# Patient Record
Sex: MALE | Race: OTHER | NOT HISPANIC OR LATINO
[De-identification: names, ages, dates, MRNs, and addresses within clinical notes are randomized per-mention and may not be internally consistent; named-entity substitution may affect disease eponyms.]

---

## 2017-02-13 ENCOUNTER — APPOINTMENT (OUTPATIENT)
Dept: OPHTHALMOLOGY | Facility: CLINIC | Age: 57
End: 2017-02-13

## 2017-02-13 PROBLEM — Z00.00 ENCOUNTER FOR PREVENTIVE HEALTH EXAMINATION: Status: ACTIVE | Noted: 2017-02-13

## 2017-02-27 ENCOUNTER — APPOINTMENT (OUTPATIENT)
Dept: OPHTHALMOLOGY | Facility: CLINIC | Age: 57
End: 2017-02-27

## 2020-04-28 ENCOUNTER — TRANSCRIPTION ENCOUNTER (OUTPATIENT)
Age: 60
End: 2020-04-28

## 2020-04-28 ENCOUNTER — INPATIENT (INPATIENT)
Facility: HOSPITAL | Age: 60
LOS: 0 days | Discharge: HOME | End: 2020-04-29
Attending: UROLOGY
Payer: COMMERCIAL

## 2020-04-28 VITALS
DIASTOLIC BLOOD PRESSURE: 91 MMHG | HEART RATE: 98 BPM | RESPIRATION RATE: 18 BRPM | OXYGEN SATURATION: 98 % | SYSTOLIC BLOOD PRESSURE: 190 MMHG | TEMPERATURE: 98 F

## 2020-04-28 DIAGNOSIS — N20.0 CALCULUS OF KIDNEY: ICD-10-CM

## 2020-04-28 LAB
ALBUMIN SERPL ELPH-MCNC: 3.8 G/DL — SIGNIFICANT CHANGE UP (ref 3.3–5)
ALP SERPL-CCNC: 64 U/L — SIGNIFICANT CHANGE UP (ref 40–120)
ALT FLD-CCNC: 19 U/L — SIGNIFICANT CHANGE UP (ref 4–41)
ANION GAP SERPL CALC-SCNC: 13 MMO/L — SIGNIFICANT CHANGE UP (ref 7–14)
APPEARANCE UR: CLEAR — SIGNIFICANT CHANGE UP
AST SERPL-CCNC: 15 U/L — SIGNIFICANT CHANGE UP (ref 4–40)
BACTERIA # UR AUTO: NEGATIVE — SIGNIFICANT CHANGE UP
BASE EXCESS BLDV CALC-SCNC: 3.7 MMOL/L — SIGNIFICANT CHANGE UP
BASOPHILS # BLD AUTO: 0.04 K/UL — SIGNIFICANT CHANGE UP (ref 0–0.2)
BASOPHILS NFR BLD AUTO: 0.3 % — SIGNIFICANT CHANGE UP (ref 0–2)
BILIRUB SERPL-MCNC: 0.6 MG/DL — SIGNIFICANT CHANGE UP (ref 0.2–1.2)
BILIRUB UR-MCNC: NEGATIVE — SIGNIFICANT CHANGE UP
BLOOD GAS VENOUS - CREATININE: 1.39 MG/DL — HIGH (ref 0.5–1.3)
BLOOD GAS VENOUS - FIO2: 21 — SIGNIFICANT CHANGE UP
BLOOD UR QL VISUAL: SIGNIFICANT CHANGE UP
BUN SERPL-MCNC: 18 MG/DL — SIGNIFICANT CHANGE UP (ref 7–23)
CALCIUM SERPL-MCNC: 9.3 MG/DL — SIGNIFICANT CHANGE UP (ref 8.4–10.5)
CHLORIDE BLDV-SCNC: 106 MMOL/L — SIGNIFICANT CHANGE UP (ref 96–108)
CHLORIDE SERPL-SCNC: 101 MMOL/L — SIGNIFICANT CHANGE UP (ref 98–107)
CO2 SERPL-SCNC: 24 MMOL/L — SIGNIFICANT CHANGE UP (ref 22–31)
COLOR SPEC: SIGNIFICANT CHANGE UP
CREAT SERPL-MCNC: 1.49 MG/DL — HIGH (ref 0.5–1.3)
EOSINOPHIL # BLD AUTO: 0.07 K/UL — SIGNIFICANT CHANGE UP (ref 0–0.5)
EOSINOPHIL NFR BLD AUTO: 0.5 % — SIGNIFICANT CHANGE UP (ref 0–6)
GAS PNL BLDV: 135 MMOL/L — LOW (ref 136–146)
GLUCOSE BLDV-MCNC: 120 MG/DL — HIGH (ref 70–99)
GLUCOSE SERPL-MCNC: 122 MG/DL — HIGH (ref 70–99)
GLUCOSE UR-MCNC: NEGATIVE — SIGNIFICANT CHANGE UP
HCO3 BLDV-SCNC: 26 MMOL/L — SIGNIFICANT CHANGE UP (ref 20–27)
HCT VFR BLD CALC: 37.8 % — LOW (ref 39–50)
HCT VFR BLDV CALC: 41.2 % — SIGNIFICANT CHANGE UP (ref 39–51)
HGB BLD-MCNC: 12.9 G/DL — LOW (ref 13–17)
HGB BLDV-MCNC: 13.4 G/DL — SIGNIFICANT CHANGE UP (ref 13–17)
HYALINE CASTS # UR AUTO: NEGATIVE — SIGNIFICANT CHANGE UP
IMM GRANULOCYTES NFR BLD AUTO: 0.5 % — SIGNIFICANT CHANGE UP (ref 0–1.5)
INR BLD: 1.27 — HIGH (ref 0.88–1.17)
KETONES UR-MCNC: NEGATIVE — SIGNIFICANT CHANGE UP
LACTATE BLDV-MCNC: 0.8 MMOL/L — SIGNIFICANT CHANGE UP (ref 0.5–2)
LEUKOCYTE ESTERASE UR-ACNC: NEGATIVE — SIGNIFICANT CHANGE UP
LIDOCAIN IGE QN: 16.9 U/L — SIGNIFICANT CHANGE UP (ref 7–60)
LYMPHOCYTES # BLD AUTO: 0.62 K/UL — LOW (ref 1–3.3)
LYMPHOCYTES # BLD AUTO: 4.1 % — LOW (ref 13–44)
MCHC RBC-ENTMCNC: 28 PG — SIGNIFICANT CHANGE UP (ref 27–34)
MCHC RBC-ENTMCNC: 34.1 % — SIGNIFICANT CHANGE UP (ref 32–36)
MCV RBC AUTO: 82.2 FL — SIGNIFICANT CHANGE UP (ref 80–100)
MONOCYTES # BLD AUTO: 1.61 K/UL — HIGH (ref 0–0.9)
MONOCYTES NFR BLD AUTO: 10.6 % — SIGNIFICANT CHANGE UP (ref 2–14)
NEUTROPHILS # BLD AUTO: 12.82 K/UL — HIGH (ref 1.8–7.4)
NEUTROPHILS NFR BLD AUTO: 84 % — HIGH (ref 43–77)
NITRITE UR-MCNC: NEGATIVE — SIGNIFICANT CHANGE UP
NRBC # FLD: 0 K/UL — SIGNIFICANT CHANGE UP (ref 0–0)
PCO2 BLDV: 50 MMHG — SIGNIFICANT CHANGE UP (ref 41–51)
PH BLDV: 7.37 PH — SIGNIFICANT CHANGE UP (ref 7.32–7.43)
PH UR: 6 — SIGNIFICANT CHANGE UP (ref 5–8)
PLATELET # BLD AUTO: 265 K/UL — SIGNIFICANT CHANGE UP (ref 150–400)
PMV BLD: 10.9 FL — SIGNIFICANT CHANGE UP (ref 7–13)
PO2 BLDV: 25 MMHG — LOW (ref 35–40)
POTASSIUM BLDV-SCNC: 4.1 MMOL/L — SIGNIFICANT CHANGE UP (ref 3.4–4.5)
POTASSIUM SERPL-MCNC: 4.3 MMOL/L — SIGNIFICANT CHANGE UP (ref 3.5–5.3)
POTASSIUM SERPL-SCNC: 4.3 MMOL/L — SIGNIFICANT CHANGE UP (ref 3.5–5.3)
PROT SERPL-MCNC: 7.6 G/DL — SIGNIFICANT CHANGE UP (ref 6–8.3)
PROT UR-MCNC: 20 — SIGNIFICANT CHANGE UP
PROTHROM AB SERPL-ACNC: 14.7 SEC — HIGH (ref 9.8–13.1)
RBC # BLD: 4.6 M/UL — SIGNIFICANT CHANGE UP (ref 4.2–5.8)
RBC # FLD: 13.9 % — SIGNIFICANT CHANGE UP (ref 10.3–14.5)
RBC CASTS # UR COMP ASSIST: SIGNIFICANT CHANGE UP (ref 0–?)
SAO2 % BLDV: 36.2 % — LOW (ref 60–85)
SARS-COV-2 RNA SPEC QL NAA+PROBE: SIGNIFICANT CHANGE UP
SODIUM SERPL-SCNC: 138 MMOL/L — SIGNIFICANT CHANGE UP (ref 135–145)
SP GR SPEC: 1.01 — SIGNIFICANT CHANGE UP (ref 1–1.04)
SQUAMOUS # UR AUTO: SIGNIFICANT CHANGE UP
UROBILINOGEN FLD QL: NORMAL — SIGNIFICANT CHANGE UP
WBC # BLD: 15.23 K/UL — HIGH (ref 3.8–10.5)
WBC # FLD AUTO: 15.23 K/UL — HIGH (ref 3.8–10.5)
WBC UR QL: SIGNIFICANT CHANGE UP (ref 0–?)

## 2020-04-28 PROCEDURE — 74177 CT ABD & PELVIS W/CONTRAST: CPT | Mod: 26

## 2020-04-28 RX ORDER — SODIUM CHLORIDE 9 MG/ML
1000 INJECTION, SOLUTION INTRAVENOUS
Refills: 0 | Status: DISCONTINUED | OUTPATIENT
Start: 2020-04-28 | End: 2020-04-29

## 2020-04-28 RX ORDER — HYDROMORPHONE HYDROCHLORIDE 2 MG/ML
0.5 INJECTION INTRAMUSCULAR; INTRAVENOUS; SUBCUTANEOUS EVERY 4 HOURS
Refills: 0 | Status: DISCONTINUED | OUTPATIENT
Start: 2020-04-28 | End: 2020-04-29

## 2020-04-28 RX ORDER — SODIUM CHLORIDE 9 MG/ML
1000 INJECTION INTRAMUSCULAR; INTRAVENOUS; SUBCUTANEOUS ONCE
Refills: 0 | Status: COMPLETED | OUTPATIENT
Start: 2020-04-28 | End: 2020-04-28

## 2020-04-28 RX ORDER — HYDROMORPHONE HYDROCHLORIDE 2 MG/ML
1 INJECTION INTRAMUSCULAR; INTRAVENOUS; SUBCUTANEOUS EVERY 4 HOURS
Refills: 0 | Status: DISCONTINUED | OUTPATIENT
Start: 2020-04-28 | End: 2020-04-29

## 2020-04-28 RX ORDER — ACETAMINOPHEN 500 MG
975 TABLET ORAL ONCE
Refills: 0 | Status: COMPLETED | OUTPATIENT
Start: 2020-04-28 | End: 2020-04-28

## 2020-04-28 RX ORDER — ACETAMINOPHEN 500 MG
975 TABLET ORAL EVERY 6 HOURS
Refills: 0 | Status: DISCONTINUED | OUTPATIENT
Start: 2020-04-28 | End: 2020-04-29

## 2020-04-28 RX ADMIN — HYDROMORPHONE HYDROCHLORIDE 1 MILLIGRAM(S): 2 INJECTION INTRAMUSCULAR; INTRAVENOUS; SUBCUTANEOUS at 21:25

## 2020-04-28 RX ADMIN — SODIUM CHLORIDE 1000 MILLILITER(S): 9 INJECTION INTRAMUSCULAR; INTRAVENOUS; SUBCUTANEOUS at 19:34

## 2020-04-28 RX ADMIN — Medication 975 MILLIGRAM(S): at 17:20

## 2020-04-28 RX ADMIN — SODIUM CHLORIDE 2000 MILLILITER(S): 9 INJECTION INTRAMUSCULAR; INTRAVENOUS; SUBCUTANEOUS at 17:48

## 2020-04-28 NOTE — ED PROVIDER NOTE - PSYCHIATRIC, MLM
72 Alert and oriented to person, place, time/situation. normal mood and affect. no apparent risk to self or others.

## 2020-04-28 NOTE — ED ADULT TRIAGE NOTE - CHIEF COMPLAINT QUOTE
pt c/o right flank pain x a few days.  pt was seen at urgent care 4 days ago and prescribed flomax and motrin but the pain persists

## 2020-04-28 NOTE — CONSULT NOTE ADULT - SUBJECTIVE AND OBJECTIVE BOX
HPI: Mr. Mcgarry is a 59yom with PMH of horshoe kidney and kidney stones (passed w/o issues ~10 and 20 years ago) who presents with 6 days of pain found to have a 1.2 cm obstructing UPJ kidney stone on the R side.  He originally went [facility] approximately 6 days ago when his symptoms first started.  He was prescribed flomax for a presumed kidney stone, which did not help his symptoms significantly.  Pain now significantly improved s/p tylenol.    O: Vital Signs Last 24 Hrs  T(C): 37.4 (2020 16:32), Max: 37.4 (2020 16:32)  T(F): 99.3 (2020 16:32), Max: 99.3 (2020 16:32)  HR: 91 (2020 16:32) (91 - 98)  BP: 127/75 (2020 16:32) (127/75 - 190/91)  BP(mean): --  RR: 16 (2020 16:32) (16 - 18)  SpO2: 100% (2020 16:32) (98% - 100%)        Physical Exam:    Gen: In no acute distress  Resp: No additional work of breathing   GI: Soft, non-tender, non-distended, with normoactive bowel sounds.  No masses. + CVA tenderness on R.  MSK: Moves all extremities equally  Skin: No rashes    Labs:                        12.9   15.23 )-----------( 265      ( 2020 16:23 )             37.8     2020 16:23    138    |  101    |  18     ----------------------------<  122    4.3     |  24     |  1.49     Ca    9.3        2020 16:23    TPro  7.6    /  Alb  3.8    /  TBili  0.6    /  DBili  x      /  AST  15     /  ALT  19     /  AlkPhos  64     2020 16:23    PT/INR - ( 2020 16:23 )   PT: 14.7 SEC;   INR: 1.27            CAPILLARY BLOOD GLUCOSE            LIVER FUNCTIONS - ( 2020 16:23 )  Alb: 3.8 g/dL / Pro: 7.6 g/dL / ALK PHOS: 64 u/L / ALT: 19 u/L / AST: 15 u/L / GGT: x             Urinalysis Basic - ( 2020 16:29 )    Color: LIGHT YELLOW / Appearance: CLEAR / S.014 / pH: 6.0  Gluc: NEGATIVE / Ketone: NEGATIVE  / Bili: NEGATIVE / Urobili: NORMAL   Blood: TRACE / Protein: 20 / Nitrite: NEGATIVE   Leuk Esterase: NEGATIVE / RBC: 0-2 / WBC 0-2   Sq Epi: OCC / Non Sq Epi: x / Bacteria: NEGATIVE        MEDICATIONS  (STANDING):    MEDICATIONS  (PRN): O: Vital Signs Last 24 Hrs  T(C): 37.4 (2020 16:32), Max: 37.4 (2020 16:32)  T(F): 99.3 (2020 16:32), Max: 99.3 (2020 16:32)  HR: 91 (2020 16:32) (91 - 98)  BP: 127/75 (2020 16:32) (127/75 - 190/91)  BP(mean): --  RR: 16 (2020 16:32) (16 - 18)  SpO2: 100% (2020 16:32) (98% - 100%)        Physical Exam:    Gen: In no acute distress  Resp: No additional work of breathing   GI: Soft, non-tender, non-distended, with normoactive bowel sounds.  No masses. + CVA tenderness on R.  MSK: Moves all extremities equally  Skin: No rashes    Labs:                        12.9   15.23 )-----------( 265      ( 2020 16:23 )             37.8     2020 16:23    138    |  101    |  18     ----------------------------<  122    4.3     |  24     |  1.49     Ca    9.3        2020 16:23    TPro  7.6    /  Alb  3.8    /  TBili  0.6    /  DBili  x      /  AST  15     /  ALT  19     /  AlkPhos  64     2020 16:23    PT/INR - ( 2020 16:23 )   PT: 14.7 SEC;   INR: 1.27            CAPILLARY BLOOD GLUCOSE            LIVER FUNCTIONS - ( 2020 16:23 )  Alb: 3.8 g/dL / Pro: 7.6 g/dL / ALK PHOS: 64 u/L / ALT: 19 u/L / AST: 15 u/L / GGT: x             Urinalysis Basic - ( 2020 16:29 )    Color: LIGHT YELLOW / Appearance: CLEAR / S.014 / pH: 6.0  Gluc: NEGATIVE / Ketone: NEGATIVE  / Bili: NEGATIVE / Urobili: NORMAL   Blood: TRACE / Protein: 20 / Nitrite: NEGATIVE   Leuk Esterase: NEGATIVE / RBC: 0-2 / WBC 0-2   Sq Epi: OCC / Non Sq Epi: x / Bacteria: NEGATIVE        MEDICATIONS  (STANDING):    MEDICATIONS  (PRN): This 58 yo M presents to ER with R. 7mm UPJ stone in a horseshoe kidney; hydro present;  pain not controlled    O: Vital Signs Last 24 Hrs  T(C): 37.4 (2020 16:32), Max: 37.4 (2020 16:32)  T(F): 99.3 (2020 16:32), Max: 99.3 (2020 16:32)  HR: 91 (2020 16:32) (91 - 98)  BP: 127/75 (2020 16:32) (127/75 - 190/91)  BP(mean): --  RR: 16 (2020 16:32) (16 - 18)  SpO2: 100% (2020 16:32) (98% - 100%)        Physical Exam:    Gen: In no acute distress  Resp: No additional work of breathing   GI: Soft, non-tender, non-distended, with normoactive bowel sounds.  No masses. + CVA tenderness on R.  MSK: Moves all extremities equally  Skin: No rashes    Labs:                        12.9   15.23 )-----------( 265      ( 2020 16:23 )             37.8     2020 16:23    138    |  101    |  18     ----------------------------<  122    4.3     |  24     |  1.49     Ca    9.3        2020 16:23    TPro  7.6    /  Alb  3.8    /  TBili  0.6    /  DBili  x      /  AST  15     /  ALT  19     /  AlkPhos  64     2020 16:23    PT/INR - ( 2020 16:23 )   PT: 14.7 SEC;   INR: 1.27            CAPILLARY BLOOD GLUCOSE            LIVER FUNCTIONS - ( 2020 16:23 )  Alb: 3.8 g/dL / Pro: 7.6 g/dL / ALK PHOS: 64 u/L / ALT: 19 u/L / AST: 15 u/L / GGT: x             Urinalysis Basic - ( 2020 16:29 )    Color: LIGHT YELLOW / Appearance: CLEAR / S.014 / pH: 6.0  Gluc: NEGATIVE / Ketone: NEGATIVE  / Bili: NEGATIVE / Urobili: NORMAL   Blood: TRACE / Protein: 20 / Nitrite: NEGATIVE   Leuk Esterase: NEGATIVE / RBC: 0-2 / WBC 0-2   Sq Epi: OCC / Non Sq Epi: x / Bacteria: NEGATIVE        MEDICATIONS  (STANDING):    MEDICATIONS  (PRN):

## 2020-04-28 NOTE — ED PROVIDER NOTE - OBJECTIVE STATEMENT
58 y/o male with a hx of HTN, Kidney Stones presents to the ER c/o right sided abdominal/back pain x 6 days.  Pt states he was seen at Urgent Care and was told it was likely a Kidney Stone; pt was given Flomax and Motrin with no relief.  Pt denies nausea, vomiting, fevers, chills, dysuria, frequency, hematuria, hx of Kidney Stones.  Pt reports hx of horse shoe kidney. 60 y/o male with a hx of HTN, Kidney Stones presents to the ER c/o right sided abdominal/back pain x 6 days.  Pt states he was seen at Urgent Care and was told it was likely a Kidney Stone; pt was given Flomax and Motrin with no relief.  Pt denies nausea, vomiting, fevers, chills, dysuria, frequency, hematuria, hx of Kidney Stones.  Pt reports hx of horse shoe kidney.  Attending - Agree with above.  I evaluated patient myself. 60 y/o M c/o right back and right abd pain x 6 days.  To UC last Friday and told likely nephrolithiasis.  Taking flomax and motrin without relief.  Denies fever, n/v/d.  No trauma.  Never had before.  Noted hx nephrolithiasis.  No dysuria or hematuria.  No CP, SOB, cough, or other covid symptoms.

## 2020-04-28 NOTE — ED PROVIDER NOTE - PHYSICAL EXAMINATION
No CVAT No CVAT  ATTENDING PHYSICAL EXAM  GEN - No resp distress.  Appears uncomfortable; A+O x3  HEAD - NC/AT; EYES/NOSE - PERRL, EOMI, mucous membranes moist, no discharge; THROAT: Oral cavity and pharynx normal. No inflammation, swelling, exudate, or lesions  NECK: Neck supple, non-tender without lymphadenopathy, no masses, no JVD  PULMONARY - CTA b/l, symmetric breath sounds  CARDIAC -s1s2, RRR, no M,R,G  ABDOMEN - +BS, + RLQ TTP, ND  BACK - no CVA tenderness, No vertebral or paravertebral tenderness  EXTREMITIES - symmetric pulses, 2+ dp, capillary refill < 2 seconds, no clubbing, no cyanosis, no edema  SKIN - no rash or bruising

## 2020-04-28 NOTE — ED ADULT NURSE NOTE - OBJECTIVE STATEMENT
Patient received for right flank pain x 8 days. Denies n+v/diarrhea/fevers/chests/SOB. Denies any dysuria. IV placed #20g Rt arm. Currently in NAD. Awaiting CT. Report endorsed to JEFFREY Rios.

## 2020-04-28 NOTE — ED PROVIDER NOTE - CLINICAL SUMMARY MEDICAL DECISION MAKING FREE TEXT BOX
58 y/o male with a hx of HTN, Kidney Stones presents to the ER c/o right sided abdominal/back pain x 6 days; pt is well appearing, NAD, +TTP RLQ, will check labs, CT, UA, reassess.

## 2020-04-28 NOTE — CONSULT NOTE ADULT - ASSESSMENT
HPI: Mr. Mcgarry is a 59yom with PMH of horshoe kidney and kidney stones (passed w/o issues ~10 and 20 years ago) who presents with 6 days of pain found to have a 1.2 cm obstructing UPJ kidney stone on the R side.  Pain now well controlled.  Plan: Mr. Mcgarry is a 59yom with PMH of horshoe kidney and kidney stones (passed w/o issues ~10 and 20 years ago) who presents with 6 days of pain found to have a 1.2 cm obstructing UPJ kidney stone on the R side.  Pain now well controlled.  Plan:  given size of stone, extremely unlikely to pass spontaneously.  recommend urgent insertion of right ureteral stent to alleviate the obstruction. Will later need definitive stone treatment.  NPO. OR notified.

## 2020-04-29 ENCOUNTER — TRANSCRIPTION ENCOUNTER (OUTPATIENT)
Age: 60
End: 2020-04-29

## 2020-04-29 VITALS
SYSTOLIC BLOOD PRESSURE: 140 MMHG | DIASTOLIC BLOOD PRESSURE: 80 MMHG | TEMPERATURE: 98 F | HEART RATE: 63 BPM | RESPIRATION RATE: 16 BRPM | OXYGEN SATURATION: 97 %

## 2020-04-29 DIAGNOSIS — N20.0 CALCULUS OF KIDNEY: ICD-10-CM

## 2020-04-29 LAB
CULTURE RESULTS: SIGNIFICANT CHANGE UP
SPECIMEN SOURCE: SIGNIFICANT CHANGE UP

## 2020-04-29 PROCEDURE — 99251: CPT | Mod: 25

## 2020-04-29 PROCEDURE — 52332 CYSTOSCOPY AND TREATMENT: CPT | Mod: RT

## 2020-04-29 PROCEDURE — 99222 1ST HOSP IP/OBS MODERATE 55: CPT | Mod: 57

## 2020-04-29 RX ORDER — FENTANYL CITRATE 50 UG/ML
25 INJECTION INTRAVENOUS
Refills: 0 | Status: DISCONTINUED | OUTPATIENT
Start: 2020-04-29 | End: 2020-04-29

## 2020-04-29 RX ORDER — FENTANYL CITRATE 50 UG/ML
50 INJECTION INTRAVENOUS
Refills: 0 | Status: DISCONTINUED | OUTPATIENT
Start: 2020-04-29 | End: 2020-04-29

## 2020-04-29 RX ORDER — ACETAMINOPHEN 500 MG
3 TABLET ORAL
Qty: 0 | Refills: 0 | DISCHARGE
Start: 2020-04-29

## 2020-04-29 RX ORDER — ONDANSETRON 8 MG/1
4 TABLET, FILM COATED ORAL ONCE
Refills: 0 | Status: DISCONTINUED | OUTPATIENT
Start: 2020-04-29 | End: 2020-04-29

## 2020-04-29 RX ADMIN — SODIUM CHLORIDE 125 MILLILITER(S): 9 INJECTION, SOLUTION INTRAVENOUS at 00:14

## 2020-04-29 RX ADMIN — SODIUM CHLORIDE 125 MILLILITER(S): 9 INJECTION, SOLUTION INTRAVENOUS at 06:57

## 2020-04-29 RX ADMIN — HYDROMORPHONE HYDROCHLORIDE 1 MILLIGRAM(S): 2 INJECTION INTRAMUSCULAR; INTRAVENOUS; SUBCUTANEOUS at 09:45

## 2020-04-29 NOTE — H&P ADULT - ASSESSMENT
This 60 yo M admitted with 7mm R. UPJ stone; for stent insertion This 60 yo M admitted with 1.2 cm R. UPJ stone; for stent insertion.  continue pain control for now until OR available for stent insertion  hydration  pre-op abx

## 2020-04-29 NOTE — DISCHARGE NOTE PROVIDER - CARE PROVIDER_API CALL
Thor Sanchez)  Urology  [address] [place] [address]  Phone: [phone]  Fax: [phone]  Follow Up Time: 2 weeks

## 2020-04-29 NOTE — DISCHARGE NOTE PROVIDER - NSDCCPCAREPLAN_GEN_ALL_CORE_FT
PRINCIPAL DISCHARGE DIAGNOSIS  Diagnosis: Kidney stone  Assessment and Plan of Treatment: Follow up with Dr. Sanchez for management of kidney stone after placement of right ureteral stent during admission

## 2020-04-29 NOTE — DISCHARGE NOTE NURSING/CASE MANAGEMENT/SOCIAL WORK - PATIENT PORTAL LINK FT
You can access the FollowMyHealth Patient Portal offered by [facility] by registering at the following website: [email or web address]. By joining [facility]’s FollowMyHealth portal, you will also be able to view your health information using other applications (apps) compatible with our system.

## 2020-04-29 NOTE — DISCHARGE NOTE PROVIDER - NSDCMRMEDTOKEN_GEN_ALL_CORE_FT
Tylenol Regular Strength 325 mg oral tablet: 3 tab(s) orally every 6 hours, As needed, Mild Pain (1 - 3)

## 2020-04-29 NOTE — DISCHARGE NOTE PROVIDER - HOSPITAL COURSE
59M with a horseshoe kidney presents with right sided flank pain 2/2 to 7 mm right ureteropelvic junction calculus. Patient admitted for pain control and went to OR on HD #1 for cystoscopy and right ureteral stent insertion. Uncomplicated hospital course. Discharged home on POD # 1 after passing trial of void. Negative urine culture

## 2020-04-29 NOTE — H&P ADULT - HISTORY OF PRESENT ILLNESS
Pt admitted with pain from R. 7mm UPJ stone; Creat 1.5; hydro, horseshoe kidney; for stent insertion Pt admitted with pain from R. 7mm UPJ stone; Creat 1.5; hydro, horseshoe kidney; for stent insertion which is currently pending

## 2020-05-13 ENCOUNTER — APPOINTMENT (OUTPATIENT)
Dept: UROLOGY | Facility: CLINIC | Age: 60
End: 2020-05-13
Payer: COMMERCIAL

## 2020-05-13 VITALS
BODY MASS INDEX: 27.13 KG/M2 | TEMPERATURE: 98 F | WEIGHT: 179 LBS | HEIGHT: 68 IN | SYSTOLIC BLOOD PRESSURE: 144 MMHG | HEART RATE: 77 BPM | DIASTOLIC BLOOD PRESSURE: 87 MMHG | RESPIRATION RATE: 17 BRPM

## 2020-05-13 DIAGNOSIS — N20.1 CALCULUS OF URETER: ICD-10-CM

## 2020-05-13 DIAGNOSIS — N13.5 CROSSING VESSEL AND STRICTURE OF URETER W/OUT HYDRONEPHROSIS: ICD-10-CM

## 2020-05-13 PROCEDURE — 99215 OFFICE O/P EST HI 40 MIN: CPT

## 2020-05-13 NOTE — HISTORY OF PRESENT ILLNESS
[FreeTextEntry1] : Mr. Mcgarry is a 58 yo M with a PMHx of nephrolithiasis who previously has passed kidney stones, Recently seen at [facility] with horseshoe kidney and obstructing 7 mm RIGHT UPJ stone s/p 6F RIGHT ureteral stent placement by Dr. Pa ([place] MRN [id number]) on 4/29/2020. \par \par Presents today for discussion of definitive management.\par \par Cr 1.49\par Urine culture <10,000 CFU/mL Normal Urogenital Nichelle \par WBC 15.23\par \par CT reviewed: showed horseshoe kidney, calyceal dilation in multiple areas with stones in nearly every calyx in addition to UPJ stone. [Urinary Retention] : no urinary retention [Urinary Incontinence] : no urinary incontinence [Urinary Urgency] : no urinary urgency

## 2020-05-13 NOTE — HISTORY OF PRESENT ILLNESS
[FreeTextEntry1] : Mr. Mcgarry is a 60 yo M with a PMHx of nephrolithiasis who previously has passed kidney stones, Recently seen at [facility] with horseshoe kidney and obstructing 7 mm RIGHT UPJ stone s/p 6F RIGHT ureteral stent placement by Dr. Pa ([place] MRN [id number]) on 4/29/2020. \par \par Presents today for discussion of definitive management.\par \par Cr 1.49\par Urine culture <10,000 CFU/mL Normal Urogenital Nichelle \par WBC 15.23\par \par CT reviewed: showed horseshoe kidney, calyceal dilation in multiple areas with stones in nearly every calyx in addition to UPJ stone. [Urinary Retention] : no urinary retention [Urinary Incontinence] : no urinary incontinence [Urinary Urgency] : no urinary urgency

## 2020-05-13 NOTE — ASSESSMENT
[Ureteral Stone (592.1\N20.1)] : position [FreeTextEntry1] : Horseshoe kidney calyceal anatomy will surely make URS laser lith challenging and perhaps unsuccessful.\par \par - Plan for RIGHT ureteroscopy possible RIGHT PCNL. Risks, benefits and alternatives discussed.\par Risk of infection, multiple procedures, ureteral injury, ureteral stricture, incomplete stones clearance, sepsis, bleeding, retention, all discussed.\par Will schedule.\par \par - Handout given\par - UCx today

## 2020-05-13 NOTE — PHYSICAL EXAM
[General Appearance - Well Developed] : well developed [General Appearance - Well Nourished] : well nourished [Heart Rate And Rhythm] : Heart rate and rhythm were normal [] : no respiratory distress [Respiration, Rhythm And Depth] : normal respiratory rhythm and effort [Bowel Sounds] : normal bowel sounds [Normal Station and Gait] : the gait and station were normal for the patient's age [Skin Color & Pigmentation] : normal skin color and pigmentation [Skin Turgor] : supple [No Focal Deficits] : no focal deficits [Oriented To Time, Place, And Person] : oriented to person, place, and time [Not Anxious] : not anxious

## 2020-05-15 LAB — BACTERIA UR CULT: NORMAL

## 2020-05-19 RX ORDER — OXYCODONE AND ACETAMINOPHEN 5; 325 MG/1; MG/1
5-325 TABLET ORAL EVERY 6 HOURS
Qty: 15 | Refills: 0 | Status: DISCONTINUED | COMMUNITY
Start: 2020-05-18 | End: 2020-05-19

## 2020-05-26 PROBLEM — I10 ESSENTIAL (PRIMARY) HYPERTENSION: Chronic | Status: ACTIVE | Noted: 2020-04-28

## 2020-05-27 ENCOUNTER — OUTPATIENT (OUTPATIENT)
Dept: OUTPATIENT SERVICES | Facility: HOSPITAL | Age: 60
LOS: 1 days | End: 2020-05-27
Payer: COMMERCIAL

## 2020-05-27 VITALS
WEIGHT: 175.93 LBS | TEMPERATURE: 98 F | RESPIRATION RATE: 16 BRPM | HEIGHT: 68 IN | OXYGEN SATURATION: 96 % | DIASTOLIC BLOOD PRESSURE: 80 MMHG | SYSTOLIC BLOOD PRESSURE: 133 MMHG | HEART RATE: 75 BPM

## 2020-05-27 DIAGNOSIS — I10 ESSENTIAL (PRIMARY) HYPERTENSION: ICD-10-CM

## 2020-05-27 DIAGNOSIS — N20.9 URINARY CALCULUS, UNSPECIFIED: ICD-10-CM

## 2020-05-27 DIAGNOSIS — Z98.890 OTHER SPECIFIED POSTPROCEDURAL STATES: Chronic | ICD-10-CM

## 2020-05-27 LAB
ANION GAP SERPL CALC-SCNC: 11 MMOL/L — SIGNIFICANT CHANGE UP (ref 5–17)
BLD GP AB SCN SERPL QL: NEGATIVE — SIGNIFICANT CHANGE UP
BUN SERPL-MCNC: 14 MG/DL — SIGNIFICANT CHANGE UP (ref 7–23)
CALCIUM SERPL-MCNC: 9.9 MG/DL — SIGNIFICANT CHANGE UP (ref 8.4–10.5)
CHLORIDE SERPL-SCNC: 103 MMOL/L — SIGNIFICANT CHANGE UP (ref 96–108)
CO2 SERPL-SCNC: 26 MMOL/L — SIGNIFICANT CHANGE UP (ref 22–31)
CREAT SERPL-MCNC: 0.95 MG/DL — SIGNIFICANT CHANGE UP (ref 0.5–1.3)
GLUCOSE SERPL-MCNC: 93 MG/DL — SIGNIFICANT CHANGE UP (ref 70–99)
HCT VFR BLD CALC: 36.7 % — LOW (ref 39–50)
HGB BLD-MCNC: 12.3 G/DL — LOW (ref 13–17)
MCHC RBC-ENTMCNC: 28.1 PG — SIGNIFICANT CHANGE UP (ref 27–34)
MCHC RBC-ENTMCNC: 33.5 GM/DL — SIGNIFICANT CHANGE UP (ref 32–36)
MCV RBC AUTO: 84 FL — SIGNIFICANT CHANGE UP (ref 80–100)
NRBC # BLD: 0 /100 WBCS — SIGNIFICANT CHANGE UP (ref 0–0)
PLATELET # BLD AUTO: 308 K/UL — SIGNIFICANT CHANGE UP (ref 150–400)
POTASSIUM SERPL-MCNC: 4 MMOL/L — SIGNIFICANT CHANGE UP (ref 3.5–5.3)
POTASSIUM SERPL-SCNC: 4 MMOL/L — SIGNIFICANT CHANGE UP (ref 3.5–5.3)
RBC # BLD: 4.37 M/UL — SIGNIFICANT CHANGE UP (ref 4.2–5.8)
RBC # FLD: 14.1 % — SIGNIFICANT CHANGE UP (ref 10.3–14.5)
RH IG SCN BLD-IMP: POSITIVE — SIGNIFICANT CHANGE UP
SARS-COV-2 RNA SPEC QL NAA+PROBE: SIGNIFICANT CHANGE UP
SODIUM SERPL-SCNC: 140 MMOL/L — SIGNIFICANT CHANGE UP (ref 135–145)
WBC # BLD: 10.81 K/UL — HIGH (ref 3.8–10.5)
WBC # FLD AUTO: 10.81 K/UL — HIGH (ref 3.8–10.5)

## 2020-05-27 RX ORDER — CHLORHEXIDINE GLUCONATE 213 G/1000ML
1 SOLUTION TOPICAL ONCE
Refills: 0 | Status: DISCONTINUED | OUTPATIENT
Start: 2020-05-29 | End: 2020-05-30

## 2020-05-27 RX ORDER — CEFAZOLIN SODIUM 1 G
2000 VIAL (EA) INJECTION ONCE
Refills: 0 | Status: DISCONTINUED | OUTPATIENT
Start: 2020-05-29 | End: 2020-05-30

## 2020-05-27 RX ORDER — LOSARTAN POTASSIUM 100 MG/1
1 TABLET, FILM COATED ORAL
Qty: 0 | Refills: 0 | DISCHARGE

## 2020-05-27 RX ORDER — LIDOCAINE HCL 20 MG/ML
0.2 VIAL (ML) INJECTION ONCE
Refills: 0 | Status: DISCONTINUED | OUTPATIENT
Start: 2020-05-29 | End: 2020-05-30

## 2020-05-27 RX ORDER — SODIUM CHLORIDE 9 MG/ML
3 INJECTION INTRAMUSCULAR; INTRAVENOUS; SUBCUTANEOUS EVERY 8 HOURS
Refills: 0 | Status: DISCONTINUED | OUTPATIENT
Start: 2020-05-29 | End: 2020-05-30

## 2020-05-27 NOTE — H&P PST ADULT - NSICDXPROBLEM_GEN_ALL_CORE_FT
PROBLEM DIAGNOSES  Problem: Urinary calculus  Assessment and Plan: Cystoscopy, right ureteroscopy  Possible right percutaneous stone extraction, stent placement  Urine C&S 5/13/2020-WNL  Labs- CBC, BMP, T&S, Covid 19 PCR      Problem: Benign hypertension  Assessment and Plan: continue with Losartan

## 2020-05-27 NOTE — H&P PST ADULT - ATTENDING COMMENTS
Patient seen and examined. Plan for cystoscopy, RIGHT percutaneous stone extraction from horseshoe kidney. Patient seen and examined. Plan for cystoscopy, RIGHT percutaneous stone extraction from horseshoe kidney on 5/29. Plan for cystoscopy, LEFT ureteroscopy with stone extraction on 6/2.

## 2020-05-27 NOTE — H&P PST ADULT - HISTORY OF PRESENT ILLNESS
58 yo M with h/o HTN, Renal calculus c/o right flank & abdominal pain since 4/23/2020. Pt had ED admission -s/p renal sonogram revealed renal calculi-underwent cystoscopy, right renal stent insertion. Pt presents to PST for pre op evaluation for cystoscopy, right ureteroscopy, possible right percutaneous stone extraction, stent placement. Pt denies any fever, chills or hematuria @ present.    **Covid 19 PCR done on 5/27/2020

## 2020-05-28 ENCOUNTER — TRANSCRIPTION ENCOUNTER (OUTPATIENT)
Age: 60
End: 2020-05-28

## 2020-05-29 ENCOUNTER — RESULT REVIEW (OUTPATIENT)
Age: 60
End: 2020-05-29

## 2020-05-29 ENCOUNTER — INPATIENT (INPATIENT)
Facility: HOSPITAL | Age: 60
LOS: 4 days | Discharge: HOME | DRG: 661 | End: 2020-06-03
Attending: UROLOGY | Admitting: UROLOGY
Payer: COMMERCIAL

## 2020-05-29 ENCOUNTER — APPOINTMENT (OUTPATIENT)
Dept: UROLOGY | Facility: HOSPITAL | Age: 60
End: 2020-05-29

## 2020-05-29 VITALS
HEIGHT: 68 IN | WEIGHT: 175.93 LBS | OXYGEN SATURATION: 98 % | TEMPERATURE: 98 F | RESPIRATION RATE: 18 BRPM | HEART RATE: 77 BPM | DIASTOLIC BLOOD PRESSURE: 94 MMHG | SYSTOLIC BLOOD PRESSURE: 164 MMHG

## 2020-05-29 DIAGNOSIS — N20.9 URINARY CALCULUS, UNSPECIFIED: ICD-10-CM

## 2020-05-29 DIAGNOSIS — Z98.890 OTHER SPECIFIED POSTPROCEDURAL STATES: Chronic | ICD-10-CM

## 2020-05-29 DIAGNOSIS — N20.0 CALCULUS OF KIDNEY: ICD-10-CM

## 2020-05-29 LAB
ANION GAP SERPL CALC-SCNC: 11 MMOL/L — SIGNIFICANT CHANGE UP (ref 5–17)
BASOPHILS # BLD AUTO: 0.05 K/UL — SIGNIFICANT CHANGE UP (ref 0–0.2)
BASOPHILS NFR BLD AUTO: 0.3 % — SIGNIFICANT CHANGE UP (ref 0–2)
BUN SERPL-MCNC: 16 MG/DL — SIGNIFICANT CHANGE UP (ref 7–23)
CALCIUM SERPL-MCNC: 9.3 MG/DL — SIGNIFICANT CHANGE UP (ref 8.4–10.5)
CHLORIDE SERPL-SCNC: 107 MMOL/L — SIGNIFICANT CHANGE UP (ref 96–108)
CO2 SERPL-SCNC: 23 MMOL/L — SIGNIFICANT CHANGE UP (ref 22–31)
CREAT SERPL-MCNC: 1.12 MG/DL — SIGNIFICANT CHANGE UP (ref 0.5–1.3)
EOSINOPHIL # BLD AUTO: 0.08 K/UL — SIGNIFICANT CHANGE UP (ref 0–0.5)
EOSINOPHIL NFR BLD AUTO: 0.5 % — SIGNIFICANT CHANGE UP (ref 0–6)
GLUCOSE SERPL-MCNC: 146 MG/DL — HIGH (ref 70–99)
HCT VFR BLD CALC: 38.7 % — LOW (ref 39–50)
HGB BLD-MCNC: 12.4 G/DL — LOW (ref 13–17)
IMM GRANULOCYTES NFR BLD AUTO: 0.6 % — SIGNIFICANT CHANGE UP (ref 0–1.5)
LYMPHOCYTES # BLD AUTO: 0.97 K/UL — LOW (ref 1–3.3)
LYMPHOCYTES # BLD AUTO: 6.7 % — LOW (ref 13–44)
MCHC RBC-ENTMCNC: 27.1 PG — SIGNIFICANT CHANGE UP (ref 27–34)
MCHC RBC-ENTMCNC: 32 GM/DL — SIGNIFICANT CHANGE UP (ref 32–36)
MCV RBC AUTO: 84.5 FL — SIGNIFICANT CHANGE UP (ref 80–100)
MONOCYTES # BLD AUTO: 0.26 K/UL — SIGNIFICANT CHANGE UP (ref 0–0.9)
MONOCYTES NFR BLD AUTO: 1.8 % — LOW (ref 2–14)
NEUTROPHILS # BLD AUTO: 13.1 K/UL — HIGH (ref 1.8–7.4)
NEUTROPHILS NFR BLD AUTO: 90.1 % — HIGH (ref 43–77)
NRBC # BLD: 0 /100 WBCS — SIGNIFICANT CHANGE UP (ref 0–0)
PLATELET # BLD AUTO: 320 K/UL — SIGNIFICANT CHANGE UP (ref 150–400)
POTASSIUM SERPL-MCNC: 4.8 MMOL/L — SIGNIFICANT CHANGE UP (ref 3.5–5.3)
POTASSIUM SERPL-SCNC: 4.8 MMOL/L — SIGNIFICANT CHANGE UP (ref 3.5–5.3)
RBC # BLD: 4.58 M/UL — SIGNIFICANT CHANGE UP (ref 4.2–5.8)
RBC # FLD: 14 % — SIGNIFICANT CHANGE UP (ref 10.3–14.5)
RH IG SCN BLD-IMP: POSITIVE — SIGNIFICANT CHANGE UP
SODIUM SERPL-SCNC: 141 MMOL/L — SIGNIFICANT CHANGE UP (ref 135–145)
WBC # BLD: 14.55 K/UL — HIGH (ref 3.8–10.5)
WBC # FLD AUTO: 14.55 K/UL — HIGH (ref 3.8–10.5)

## 2020-05-29 PROCEDURE — 86900 BLOOD TYPING SEROLOGIC ABO: CPT

## 2020-05-29 PROCEDURE — 76000 FLUOROSCOPY <1 HR PHYS/QHP: CPT

## 2020-05-29 PROCEDURE — G0463: CPT

## 2020-05-29 PROCEDURE — 86901 BLOOD TYPING SEROLOGIC RH(D): CPT

## 2020-05-29 PROCEDURE — 85027 COMPLETE CBC AUTOMATED: CPT

## 2020-05-29 PROCEDURE — 71045 X-RAY EXAM CHEST 1 VIEW: CPT | Mod: 26

## 2020-05-29 PROCEDURE — 88300 SURGICAL PATH GROSS: CPT | Mod: 26

## 2020-05-29 PROCEDURE — 80048 BASIC METABOLIC PNL TOTAL CA: CPT

## 2020-05-29 PROCEDURE — 86850 RBC ANTIBODY SCREEN: CPT

## 2020-05-29 RX ORDER — ACETAMINOPHEN 500 MG
1000 TABLET ORAL EVERY 6 HOURS
Refills: 0 | Status: COMPLETED | OUTPATIENT
Start: 2020-05-29 | End: 2020-05-30

## 2020-05-29 RX ORDER — HALOPERIDOL DECANOATE 100 MG/ML
1 INJECTION INTRAMUSCULAR ONCE
Refills: 0 | Status: DISCONTINUED | OUTPATIENT
Start: 2020-05-29 | End: 2020-05-30

## 2020-05-29 RX ORDER — SENNA PLUS 8.6 MG/1
2 TABLET ORAL AT BEDTIME
Refills: 0 | Status: DISCONTINUED | OUTPATIENT
Start: 2020-05-29 | End: 2020-06-02

## 2020-05-29 RX ORDER — LOSARTAN POTASSIUM 100 MG/1
25 TABLET, FILM COATED ORAL DAILY
Refills: 0 | Status: DISCONTINUED | OUTPATIENT
Start: 2020-05-29 | End: 2020-06-02

## 2020-05-29 RX ORDER — HEPARIN SODIUM 5000 [USP'U]/ML
5000 INJECTION INTRAVENOUS; SUBCUTANEOUS EVERY 8 HOURS
Refills: 0 | Status: DISCONTINUED | OUTPATIENT
Start: 2020-05-29 | End: 2020-06-02

## 2020-05-29 RX ORDER — CEFAZOLIN SODIUM 1 G
2000 VIAL (EA) INJECTION EVERY 8 HOURS
Refills: 0 | Status: COMPLETED | OUTPATIENT
Start: 2020-05-29 | End: 2020-05-30

## 2020-05-29 RX ORDER — SODIUM CHLORIDE 9 MG/ML
1000 INJECTION, SOLUTION INTRAVENOUS
Refills: 0 | Status: DISCONTINUED | OUTPATIENT
Start: 2020-05-29 | End: 2020-05-31

## 2020-05-29 RX ORDER — HYDROMORPHONE HYDROCHLORIDE 2 MG/ML
1 INJECTION INTRAMUSCULAR; INTRAVENOUS; SUBCUTANEOUS
Refills: 0 | Status: DISCONTINUED | OUTPATIENT
Start: 2020-05-29 | End: 2020-05-29

## 2020-05-29 RX ORDER — HYDROMORPHONE HYDROCHLORIDE 2 MG/ML
0.5 INJECTION INTRAMUSCULAR; INTRAVENOUS; SUBCUTANEOUS
Refills: 0 | Status: DISCONTINUED | OUTPATIENT
Start: 2020-05-29 | End: 2020-05-29

## 2020-05-29 RX ADMIN — HYDROMORPHONE HYDROCHLORIDE 0.5 MILLIGRAM(S): 2 INJECTION INTRAMUSCULAR; INTRAVENOUS; SUBCUTANEOUS at 13:00

## 2020-05-29 RX ADMIN — SODIUM CHLORIDE 125 MILLILITER(S): 9 INJECTION, SOLUTION INTRAVENOUS at 23:04

## 2020-05-29 RX ADMIN — Medication 400 MILLIGRAM(S): at 23:05

## 2020-05-29 RX ADMIN — Medication 100 MILLIGRAM(S): at 17:36

## 2020-05-29 RX ADMIN — Medication 400 MILLIGRAM(S): at 17:35

## 2020-05-29 RX ADMIN — HEPARIN SODIUM 5000 UNIT(S): 5000 INJECTION INTRAVENOUS; SUBCUTANEOUS at 21:21

## 2020-05-29 RX ADMIN — SODIUM CHLORIDE 3 MILLILITER(S): 9 INJECTION INTRAMUSCULAR; INTRAVENOUS; SUBCUTANEOUS at 13:04

## 2020-05-30 LAB
ANION GAP SERPL CALC-SCNC: 11 MMOL/L — SIGNIFICANT CHANGE UP (ref 5–17)
BASOPHILS # BLD AUTO: 0.03 K/UL — SIGNIFICANT CHANGE UP (ref 0–0.2)
BASOPHILS NFR BLD AUTO: 0.2 % — SIGNIFICANT CHANGE UP (ref 0–2)
BUN SERPL-MCNC: 13 MG/DL — SIGNIFICANT CHANGE UP (ref 7–23)
CALCIUM SERPL-MCNC: 8.9 MG/DL — SIGNIFICANT CHANGE UP (ref 8.4–10.5)
CHLORIDE SERPL-SCNC: 105 MMOL/L — SIGNIFICANT CHANGE UP (ref 96–108)
CO2 SERPL-SCNC: 25 MMOL/L — SIGNIFICANT CHANGE UP (ref 22–31)
CREAT SERPL-MCNC: 1.02 MG/DL — SIGNIFICANT CHANGE UP (ref 0.5–1.3)
EOSINOPHIL # BLD AUTO: 0.02 K/UL — SIGNIFICANT CHANGE UP (ref 0–0.5)
EOSINOPHIL NFR BLD AUTO: 0.1 % — SIGNIFICANT CHANGE UP (ref 0–6)
GLUCOSE SERPL-MCNC: 113 MG/DL — HIGH (ref 70–99)
HCT VFR BLD CALC: 34.1 % — LOW (ref 39–50)
HGB BLD-MCNC: 11.2 G/DL — LOW (ref 13–17)
IMM GRANULOCYTES NFR BLD AUTO: 0.4 % — SIGNIFICANT CHANGE UP (ref 0–1.5)
LYMPHOCYTES # BLD AUTO: 1.47 K/UL — SIGNIFICANT CHANGE UP (ref 1–3.3)
LYMPHOCYTES # BLD AUTO: 8.3 % — LOW (ref 13–44)
MCHC RBC-ENTMCNC: 27.1 PG — SIGNIFICANT CHANGE UP (ref 27–34)
MCHC RBC-ENTMCNC: 32.8 GM/DL — SIGNIFICANT CHANGE UP (ref 32–36)
MCV RBC AUTO: 82.4 FL — SIGNIFICANT CHANGE UP (ref 80–100)
MONOCYTES # BLD AUTO: 1.17 K/UL — HIGH (ref 0–0.9)
MONOCYTES NFR BLD AUTO: 6.6 % — SIGNIFICANT CHANGE UP (ref 2–14)
NEUTROPHILS # BLD AUTO: 14.95 K/UL — HIGH (ref 1.8–7.4)
NEUTROPHILS NFR BLD AUTO: 84.4 % — HIGH (ref 43–77)
NRBC # BLD: 0 /100 WBCS — SIGNIFICANT CHANGE UP (ref 0–0)
PLATELET # BLD AUTO: 311 K/UL — SIGNIFICANT CHANGE UP (ref 150–400)
POTASSIUM SERPL-MCNC: 3.7 MMOL/L — SIGNIFICANT CHANGE UP (ref 3.5–5.3)
POTASSIUM SERPL-SCNC: 3.7 MMOL/L — SIGNIFICANT CHANGE UP (ref 3.5–5.3)
RBC # BLD: 4.14 M/UL — LOW (ref 4.2–5.8)
RBC # FLD: 14 % — SIGNIFICANT CHANGE UP (ref 10.3–14.5)
SODIUM SERPL-SCNC: 141 MMOL/L — SIGNIFICANT CHANGE UP (ref 135–145)
WBC # BLD: 17.71 K/UL — HIGH (ref 3.8–10.5)
WBC # FLD AUTO: 17.71 K/UL — HIGH (ref 3.8–10.5)

## 2020-05-30 PROCEDURE — 74176 CT ABD & PELVIS W/O CONTRAST: CPT | Mod: 26

## 2020-05-30 RX ORDER — ACETAMINOPHEN 500 MG
1000 TABLET ORAL ONCE
Refills: 0 | Status: COMPLETED | OUTPATIENT
Start: 2020-05-30 | End: 2020-05-30

## 2020-05-30 RX ORDER — CEFAZOLIN SODIUM 1 G
1000 VIAL (EA) INJECTION EVERY 8 HOURS
Refills: 0 | Status: DISCONTINUED | OUTPATIENT
Start: 2020-05-30 | End: 2020-06-02

## 2020-05-30 RX ADMIN — HEPARIN SODIUM 5000 UNIT(S): 5000 INJECTION INTRAVENOUS; SUBCUTANEOUS at 14:08

## 2020-05-30 RX ADMIN — Medication 100 MILLIGRAM(S): at 01:29

## 2020-05-30 RX ADMIN — HEPARIN SODIUM 5000 UNIT(S): 5000 INJECTION INTRAVENOUS; SUBCUTANEOUS at 05:01

## 2020-05-30 RX ADMIN — Medication 400 MILLIGRAM(S): at 22:47

## 2020-05-30 RX ADMIN — LOSARTAN POTASSIUM 25 MILLIGRAM(S): 100 TABLET, FILM COATED ORAL at 05:01

## 2020-05-30 RX ADMIN — Medication 100 MILLIGRAM(S): at 21:47

## 2020-05-30 RX ADMIN — HEPARIN SODIUM 5000 UNIT(S): 5000 INJECTION INTRAVENOUS; SUBCUTANEOUS at 21:46

## 2020-05-30 RX ADMIN — Medication 400 MILLIGRAM(S): at 05:01

## 2020-05-30 RX ADMIN — Medication 100 MILLIGRAM(S): at 14:08

## 2020-05-30 RX ADMIN — Medication 400 MILLIGRAM(S): at 12:28

## 2020-05-30 RX ADMIN — SODIUM CHLORIDE 125 MILLILITER(S): 9 INJECTION, SOLUTION INTRAVENOUS at 21:47

## 2020-05-30 NOTE — PROGRESS NOTE ADULT - SUBJECTIVE AND OBJECTIVE BOX
UROLOGY Progress Note  ZHEN CALDERON    S: Patient seen at bedside.  Doing well, no issues overnight.  Urine colors appropriate.     O:  T(C): 36.9 (05-30-20 @ 09:16), Max: 37.2 (05-29-20 @ 19:33)  HR: 63 (05-30-20 @ 09:16) (59 - 85)  BP: 143/80 (05-30-20 @ 09:16) (121/65 - 164/94)  RR: 18 (05-30-20 @ 09:16) (14 - 18)  SpO2: 100% (05-30-20 @ 09:16) (94% - 100%)    Physical Exam:  Gen: NAD  Neuro: Follows commands  Resp: No acute respiratory distress, normal effort  CV: Normal rate, regular rhythm  Abd: Soft, NT, ND  	Incision: Dressing c/d/i  : ortez draining leroy colord urine, NT yellow    Labs:  CBC (05-30 @ 07:19)                              11.2<L>                         17.71<H>  )----------------(  311        84.4<H>% Neutrophils, 8.3<L>% Lymphocytes, ANC: 14.95<H>                              34.1<L>              CBC (05-29 @ 12:42)                              12.4<L>                         14.55<H>  )----------------(  320        90.1<H>% Neutrophils, 6.7<L>% Lymphocytes, ANC: 13.10<H>                              38.7<L>                BMP (05-30 @ 07:19)             141     |  105     |  13    		Ca++ --      Ca 8.9                ---------------------------------( 113<H>		Mg --                 3.7     |  25      |  1.02  			Ph --      BMP (05-29 @ 12:42)             141     |  107     |  16    		Ca++ --      Ca 9.3                ---------------------------------( 146<H>		Mg --                 4.8     |  23      |  1.12  			Ph --

## 2020-05-30 NOTE — PROGRESS NOTE ADULT - ASSESSMENT
A/P: 60yo M s/p R. PCNL 1:1:min, nephroureteral catheter (5/29).    - AM labs reviewed  - Ancef  - UCx/Stone Cx from OR  - Check color/back  - Keep ortez catheter  - AM CT  - Plan for return to OR for 2nd stage/contralateral side Tuesday A/P: 58yo M s/p R. PCNL 1:1:min, nephroureteral catheter (5/29).    - AM labs reviewed  - Ancef  - UCx/Stone Cx from OR  - Check color/back  - Keep ortez catheter  - AM CT  - Plan for return to OR for 2nd stage on the right and also contralateral side Tuesday. Discussed with patient that retrograde approach would be used and he will very likely have residual stone since some stones may be submucosal since they were not visible via perc nephroscopy.

## 2020-05-31 LAB
ANION GAP SERPL CALC-SCNC: 13 MMOL/L — SIGNIFICANT CHANGE UP (ref 5–17)
BUN SERPL-MCNC: 19 MG/DL — SIGNIFICANT CHANGE UP (ref 7–23)
CALCIUM SERPL-MCNC: 8.9 MG/DL — SIGNIFICANT CHANGE UP (ref 8.4–10.5)
CHLORIDE SERPL-SCNC: 105 MMOL/L — SIGNIFICANT CHANGE UP (ref 96–108)
CO2 SERPL-SCNC: 25 MMOL/L — SIGNIFICANT CHANGE UP (ref 22–31)
CREAT SERPL-MCNC: 1.55 MG/DL — HIGH (ref 0.5–1.3)
CULTURE RESULTS: NO GROWTH — SIGNIFICANT CHANGE UP
CULTURE RESULTS: NO GROWTH — SIGNIFICANT CHANGE UP
GLUCOSE SERPL-MCNC: 158 MG/DL — HIGH (ref 70–99)
HCT VFR BLD CALC: 34.7 % — LOW (ref 39–50)
HGB BLD-MCNC: 11.4 G/DL — LOW (ref 13–17)
MCHC RBC-ENTMCNC: 27.1 PG — SIGNIFICANT CHANGE UP (ref 27–34)
MCHC RBC-ENTMCNC: 32.9 GM/DL — SIGNIFICANT CHANGE UP (ref 32–36)
MCV RBC AUTO: 82.6 FL — SIGNIFICANT CHANGE UP (ref 80–100)
NRBC # BLD: 0 /100 WBCS — SIGNIFICANT CHANGE UP (ref 0–0)
PLATELET # BLD AUTO: 291 K/UL — SIGNIFICANT CHANGE UP (ref 150–400)
POTASSIUM SERPL-MCNC: 3.7 MMOL/L — SIGNIFICANT CHANGE UP (ref 3.5–5.3)
POTASSIUM SERPL-SCNC: 3.7 MMOL/L — SIGNIFICANT CHANGE UP (ref 3.5–5.3)
RBC # BLD: 4.2 M/UL — SIGNIFICANT CHANGE UP (ref 4.2–5.8)
RBC # FLD: 14.3 % — SIGNIFICANT CHANGE UP (ref 10.3–14.5)
SODIUM SERPL-SCNC: 143 MMOL/L — SIGNIFICANT CHANGE UP (ref 135–145)
SPECIMEN SOURCE: SIGNIFICANT CHANGE UP
SPECIMEN SOURCE: SIGNIFICANT CHANGE UP
WBC # BLD: 15.94 K/UL — HIGH (ref 3.8–10.5)
WBC # FLD AUTO: 15.94 K/UL — HIGH (ref 3.8–10.5)

## 2020-05-31 RX ORDER — KETOROLAC TROMETHAMINE 30 MG/ML
15 SYRINGE (ML) INJECTION ONCE
Refills: 0 | Status: DISCONTINUED | OUTPATIENT
Start: 2020-05-31 | End: 2020-05-31

## 2020-05-31 RX ORDER — POLYETHYLENE GLYCOL 3350 17 G/17G
17 POWDER, FOR SOLUTION ORAL ONCE
Refills: 0 | Status: COMPLETED | OUTPATIENT
Start: 2020-05-31 | End: 2020-05-31

## 2020-05-31 RX ADMIN — Medication 100 MILLIGRAM(S): at 21:22

## 2020-05-31 RX ADMIN — HEPARIN SODIUM 5000 UNIT(S): 5000 INJECTION INTRAVENOUS; SUBCUTANEOUS at 21:23

## 2020-05-31 RX ADMIN — Medication 100 MILLIGRAM(S): at 14:02

## 2020-05-31 RX ADMIN — LOSARTAN POTASSIUM 25 MILLIGRAM(S): 100 TABLET, FILM COATED ORAL at 05:51

## 2020-05-31 RX ADMIN — POLYETHYLENE GLYCOL 3350 17 GRAM(S): 17 POWDER, FOR SOLUTION ORAL at 12:01

## 2020-05-31 RX ADMIN — Medication 15 MILLIGRAM(S): at 01:02

## 2020-05-31 RX ADMIN — HEPARIN SODIUM 5000 UNIT(S): 5000 INJECTION INTRAVENOUS; SUBCUTANEOUS at 14:02

## 2020-05-31 RX ADMIN — SODIUM CHLORIDE 125 MILLILITER(S): 9 INJECTION, SOLUTION INTRAVENOUS at 05:52

## 2020-05-31 RX ADMIN — Medication 100 MILLIGRAM(S): at 05:52

## 2020-05-31 RX ADMIN — HEPARIN SODIUM 5000 UNIT(S): 5000 INJECTION INTRAVENOUS; SUBCUTANEOUS at 05:51

## 2020-05-31 NOTE — PROGRESS NOTE ADULT - SUBJECTIVE AND OBJECTIVE BOX
Subjective    Complaining of left sided abdominal pain overnight.  No nausea/vomiting, passing flatus but constipated - last bowel movement before admission.  NT draining clear punch.  Ortez draining clear watermelon.    Objective    Vital signs  T(F): , Max: 99.4 (05-31-20 @ 01:14)  HR: 93 (05-31-20 @ 05:19)  BP: 138/78 (05-31-20 @ 05:19)  SpO2: 96% (05-31-20 @ 05:19)  Wt(kg): --    Output     OUT:    Indwelling Catheter - Urethral: 1800 mL    Nephrostomy Tube: 3600 mL  Total OUT: 5400 mL    Total NET: -5400 mL          Physical Exam  Gen NAD  Abd soft NT ND, somewhat tender in LLQ, non-peritoneal   ortez draining clear watermelon colored urine, NT clear punch    Labs      05-30 @ 07:19    WBC 17.71 / Hct 34.1  / SCr 1.02     05-29 @ 12:42    WBC 14.55 / Hct 38.7  / SCr 1.12       Urine Cx:   Culture - Urine (05.29.20 @ 18:40)    Specimen Source: .Urine Catheterized    Culture Results:   No growth      Imaging  < from: CT Abdomen and Pelvis No Cont (05.30.20 @ 11:41) >    EXAM:  CT ABDOMEN AND PELVIS                            PROCEDURE DATE:  05/30/2020            INTERPRETATION:  CLINICAL INFORMATION: Status post percutaneous nephrolithotomy, evaluate for nephrolithiasis.    COMPARISON: CT abdomen pelvis 4/28/2020.    PROCEDURE:   CT of the Abdomen and Pelvis was performed without intravenous contrast.   Intravenous contrast: None.  Oral contrast: None.  Sagittal and coronal reformats were performed.    FINDINGS:  LOWER CHEST: Bilateral lower lobe subsegmentalatelectasis.    LIVER: Within normal limits.  BILE DUCTS: Normal caliber.  GALLBLADDER: Within normal limits.  SPLEEN: Within normal limits.  PANCREAS: Within normal limits.  ADRENALS: Within normal limits.  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 Mild right-sided perinephric hazy changes. There are multiple nonobstructing renal calculi within the left portion of the horseshoe kidney,the largest of which measures 5 mm transverse. Previously noted 0.7 cm stone in the ureter is now noted either within the distal ureter and/or within the urinary bladder.    BLADDER: Ortez catheter.  REPRODUCTIVE ORGANS: Prostate within normal limits.    BOWEL: No bowel obstruction. Appendix is normal.  PERITONEUM: No ascites.  VESSELS: Within normal limits.  RETROPERITONEUM/LYMPH NODES: No lymphadenopathy.    ABDOMINAL WALL: Within normal limits.  BONES: Degenerative changes.    IMPRESSION:   Horseshoe shaped kidney with interval placement of right-sided nephroureteral stent. Previously noted 0.7 cm stone in the ureter is now noted either within the distal ureter and/or within the urinary bladder.  There remain multiple nonobstructing renal calculi within the left portion of the horseshoe kidney, as measured above.

## 2020-05-31 NOTE — PROGRESS NOTE ADULT - ASSESSMENT
A/P: 60yo M s/p R. PCNL 1:1:min, nephroureteral catheter (5/29).    - AM labs reviewed  - Ancef  - UCx/Stone Cx from OR  - Check color/back  - Keep ortez catheter  - Plan for return to OR for 2nd stage/contralateral side Tuesday  - Will need repeat COVID prior to OR

## 2020-06-01 ENCOUNTER — TRANSCRIPTION ENCOUNTER (OUTPATIENT)
Age: 60
End: 2020-06-01

## 2020-06-01 PROBLEM — N20.0 CALCULUS OF KIDNEY: Chronic | Status: ACTIVE | Noted: 2020-05-27

## 2020-06-01 LAB
ANION GAP SERPL CALC-SCNC: 14 MMOL/L — SIGNIFICANT CHANGE UP (ref 5–17)
BLD GP AB SCN SERPL QL: NEGATIVE — SIGNIFICANT CHANGE UP
BUN SERPL-MCNC: 23 MG/DL — SIGNIFICANT CHANGE UP (ref 7–23)
CALCIUM SERPL-MCNC: 8.9 MG/DL — SIGNIFICANT CHANGE UP (ref 8.4–10.5)
CHLORIDE SERPL-SCNC: 103 MMOL/L — SIGNIFICANT CHANGE UP (ref 96–108)
CO2 SERPL-SCNC: 25 MMOL/L — SIGNIFICANT CHANGE UP (ref 22–31)
CREAT SERPL-MCNC: 1.84 MG/DL — HIGH (ref 0.5–1.3)
GLUCOSE SERPL-MCNC: 95 MG/DL — SIGNIFICANT CHANGE UP (ref 70–99)
HCT VFR BLD CALC: 34.4 % — LOW (ref 39–50)
HGB BLD-MCNC: 11.3 G/DL — LOW (ref 13–17)
MCHC RBC-ENTMCNC: 27.4 PG — SIGNIFICANT CHANGE UP (ref 27–34)
MCHC RBC-ENTMCNC: 32.8 GM/DL — SIGNIFICANT CHANGE UP (ref 32–36)
MCV RBC AUTO: 83.3 FL — SIGNIFICANT CHANGE UP (ref 80–100)
NRBC # BLD: 0 /100 WBCS — SIGNIFICANT CHANGE UP (ref 0–0)
PLATELET # BLD AUTO: 302 K/UL — SIGNIFICANT CHANGE UP (ref 150–400)
POTASSIUM SERPL-MCNC: 4 MMOL/L — SIGNIFICANT CHANGE UP (ref 3.5–5.3)
POTASSIUM SERPL-SCNC: 4 MMOL/L — SIGNIFICANT CHANGE UP (ref 3.5–5.3)
RBC # BLD: 4.13 M/UL — LOW (ref 4.2–5.8)
RBC # FLD: 14.3 % — SIGNIFICANT CHANGE UP (ref 10.3–14.5)
RH IG SCN BLD-IMP: POSITIVE — SIGNIFICANT CHANGE UP
SARS-COV-2 RNA SPEC QL NAA+PROBE: SIGNIFICANT CHANGE UP
SODIUM SERPL-SCNC: 142 MMOL/L — SIGNIFICANT CHANGE UP (ref 135–145)
WBC # BLD: 14.31 K/UL — HIGH (ref 3.8–10.5)
WBC # FLD AUTO: 14.31 K/UL — HIGH (ref 3.8–10.5)

## 2020-06-01 RX ORDER — ACETAMINOPHEN 500 MG
1000 TABLET ORAL ONCE
Refills: 0 | Status: COMPLETED | OUTPATIENT
Start: 2020-06-01 | End: 2020-06-01

## 2020-06-01 RX ORDER — SODIUM CHLORIDE 9 MG/ML
1000 INJECTION, SOLUTION INTRAVENOUS
Refills: 0 | Status: DISCONTINUED | OUTPATIENT
Start: 2020-06-01 | End: 2020-06-02

## 2020-06-01 RX ADMIN — Medication 400 MILLIGRAM(S): at 00:56

## 2020-06-01 RX ADMIN — HEPARIN SODIUM 5000 UNIT(S): 5000 INJECTION INTRAVENOUS; SUBCUTANEOUS at 06:36

## 2020-06-01 RX ADMIN — Medication 100 MILLIGRAM(S): at 21:13

## 2020-06-01 RX ADMIN — HEPARIN SODIUM 5000 UNIT(S): 5000 INJECTION INTRAVENOUS; SUBCUTANEOUS at 14:20

## 2020-06-01 RX ADMIN — Medication 100 MILLIGRAM(S): at 06:39

## 2020-06-01 RX ADMIN — SODIUM CHLORIDE 75 MILLILITER(S): 9 INJECTION, SOLUTION INTRAVENOUS at 21:13

## 2020-06-01 RX ADMIN — HEPARIN SODIUM 5000 UNIT(S): 5000 INJECTION INTRAVENOUS; SUBCUTANEOUS at 21:13

## 2020-06-01 RX ADMIN — LOSARTAN POTASSIUM 25 MILLIGRAM(S): 100 TABLET, FILM COATED ORAL at 06:36

## 2020-06-01 RX ADMIN — Medication 100 MILLIGRAM(S): at 14:19

## 2020-06-01 NOTE — PRE-ANESTHESIA EVALUATION ADULT - NSANTHPMHFT_GEN_ALL_CORE
59M PMH HTN, s/p PCNL with nephroureteral catheter on 5/29 59M PMH HTN, s/p PCNL with nephroureteral catheter on 5/29    METS>4. denies CP/SOB, cough, fever

## 2020-06-01 NOTE — PROGRESS NOTE ADULT - ASSESSMENT
A/P: 58yo M s/p R. PCNL 1:1:min, nephroureteral catheter (5/29).    - AM labs pending  - Ancef  - UCx/Stone Cx negative  - Check color/back  - Keep ortez catheter  - Plan for return to OR for 2nd stage/contralateral side Tuesday  - Will need repeat COVID prior to OR

## 2020-06-01 NOTE — PRE-ANESTHESIA EVALUATION ADULT - NSANTHOSAYNRD_GEN_A_CORE
No. BROOKE screening performed.  STOP BANG Legend: 0-2 = LOW Risk; 3-4 = INTERMEDIATE Risk; 5-8 = HIGH Risk
No. BROOKE screening performed.  STOP BANG Legend: 0-2 = LOW Risk; 3-4 = INTERMEDIATE Risk; 5-8 = HIGH Risk

## 2020-06-01 NOTE — PROGRESS NOTE ADULT - SUBJECTIVE AND OBJECTIVE BOX
Subjective    Patient seen at bedside.  Reports mild pain overnight, controlled with Tylenol.    Denies fever/chills, ortez and NT with light punch clear output    Objective  Vital Signs Last 24 Hrs  T(C): 36.8 (01 Jun 2020 06:34), Max: 37.2 (01 Jun 2020 01:03)  T(F): 98.3 (01 Jun 2020 06:34), Max: 99 (01 Jun 2020 01:03)  HR: 61 (01 Jun 2020 06:34) (61 - 96)  BP: 123/71 (01 Jun 2020 06:34) (123/71 - 136/76)  BP(mean): --  RR: 18 (01 Jun 2020 06:34) (18 - 18)  SpO2: 95% (01 Jun 2020 06:34) (95% - 98%)    05-31-20 @ 07:01  -  06-01-20 @ 07:00  --------------------------------------------------------  IN: 1240 mL / OUT: 2200 mL / NET: -960 mL      Physical Exam  Gen NAD  Abd soft NT ND, mild L. flank TTP, non-peritoneal   ortez and NT with clear light punch    Labs  CBC (05-31 @ 09:59)                              11.4<L>                         15.94<H>  )----------------(  291        --    % Neutrophils, --    % Lymphocytes, ANC: --                                  34.7<L>                BMP (05-31 @ 09:59)             143     |  105     |  19    		Ca++ --      Ca 8.9                ---------------------------------( 158<H>		Mg --                 3.7     |  25      |  1.55<H>			Ph --                -> .Urine Catheterized Culture (05-29 @ 18:40)     NG    NG    No growth    -> Skin right kidney stone Culture (05-29 @ 17:34)     NG    NG    No growth        Imaging  < from: CT Abdomen and Pelvis No Cont (05.30.20 @ 11:41) >    EXAM:  CT ABDOMEN AND PELVIS                            PROCEDURE DATE:  05/30/2020            INTERPRETATION:  CLINICAL INFORMATION: Status post percutaneous nephrolithotomy, evaluate for nephrolithiasis.    COMPARISON: CT abdomen pelvis 4/28/2020.    PROCEDURE:   CT of the Abdomen and Pelvis was performed without intravenous contrast.   Intravenous contrast: None.  Oral contrast: None.  Sagittal and coronal reformats were performed.    FINDINGS:  LOWER CHEST: Bilateral lower lobe subsegmentalatelectasis.    LIVER: Within normal limits.  BILE DUCTS: Normal caliber.  GALLBLADDER: Within normal limits.  SPLEEN: Within normal limits.  PANCREAS: Within normal limits.  ADRENALS: Within normal limits.  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 Mild right-sided perinephric hazy changes. There are multiple nonobstructing renal calculi within the left portion of the horseshoe kidney,the largest of which measures 5 mm transverse. Previously noted 0.7 cm stone in the ureter is now noted either within the distal ureter and/or within the urinary bladder.    BLADDER: Ortez catheter.  REPRODUCTIVE ORGANS: Prostate within normal limits.    BOWEL: No bowel obstruction. Appendix is normal.  PERITONEUM: No ascites.  VESSELS: Within normal limits.  RETROPERITONEUM/LYMPH NODES: No lymphadenopathy.    ABDOMINAL WALL: Within normal limits.  BONES: Degenerative changes.    IMPRESSION:   Horseshoe shaped kidney with interval placement of right-sided nephroureteral stent. Previously noted 0.7 cm stone in the ureter is now noted either within the distal ureter and/or within the urinary bladder.  There remain multiple nonobstructing renal calculi within the left portion of the horseshoe kidney, as measured above.

## 2020-06-01 NOTE — PROGRESS NOTE ADULT - SUBJECTIVE AND OBJECTIVE BOX
Urology Preop Note    Patient is a 59y old  Male who presents with a chief complaint of     Diagnosis: nephrolithiasis  Procedure: left PCNL, possible 2nd look R PCNL  Surgeon: Dr Sanchez                              11.4   15.94 )-----------( 291      ( 31 May 2020 09:59 )             34.7         142  |  103  |  23  ----------------------------<  95  4.0   |  25  |  1.84<H>    Ca    8.9      01 Jun 2020 07:57                Urine Culture: no growth    Chest X-ray: clear lungs    EKG: normal sinus rhythm / pending    Orders: NPO/IVF after midnight    Consent: in patient's chart pending attending signature Urology Preop Note    Patient is a 59y old  Male who presents with a chief complaint of     Diagnosis: nephrolithiasis  Procedure: left PCNL, possible 2nd look R PCNL  Surgeon: Dr Sanchez                              11.4   15.94 )-----------( 291      ( 31 May 2020 09:59 )             34.7         142  |  103  |  23  ----------------------------<  95  4.0   |  25  |  1.84<H>    Ca    8.9      01 Jun 2020 07:57                Urine Culture: no growth    Chest X-ray: clear lungs    EKG: normal sinus rhythm    Orders: NPO/IVF after midnight    Consent: in patient's chart pending attending signature

## 2020-06-01 NOTE — PROGRESS NOTE ADULT - ATTENDING COMMENTS
Patient seen and examined. Agree with plan for second look R PCNL and L percutaneous stone extraction. COVID test pending. On antibiotics for OR. NPO at midnight.

## 2020-06-02 ENCOUNTER — RESULT REVIEW (OUTPATIENT)
Age: 60
End: 2020-06-02

## 2020-06-02 ENCOUNTER — APPOINTMENT (OUTPATIENT)
Dept: UROLOGY | Facility: HOSPITAL | Age: 60
End: 2020-06-02

## 2020-06-02 LAB
ANION GAP SERPL CALC-SCNC: 12 MMOL/L — SIGNIFICANT CHANGE UP (ref 5–17)
BUN SERPL-MCNC: 16 MG/DL — SIGNIFICANT CHANGE UP (ref 7–23)
CALCIUM SERPL-MCNC: 9.1 MG/DL — SIGNIFICANT CHANGE UP (ref 8.4–10.5)
CHLORIDE SERPL-SCNC: 103 MMOL/L — SIGNIFICANT CHANGE UP (ref 96–108)
CO2 SERPL-SCNC: 26 MMOL/L — SIGNIFICANT CHANGE UP (ref 22–31)
CREAT SERPL-MCNC: 1.16 MG/DL — SIGNIFICANT CHANGE UP (ref 0.5–1.3)
GLUCOSE SERPL-MCNC: 125 MG/DL — HIGH (ref 70–99)
POTASSIUM SERPL-MCNC: 3.7 MMOL/L — SIGNIFICANT CHANGE UP (ref 3.5–5.3)
POTASSIUM SERPL-SCNC: 3.7 MMOL/L — SIGNIFICANT CHANGE UP (ref 3.5–5.3)
SODIUM SERPL-SCNC: 141 MMOL/L — SIGNIFICANT CHANGE UP (ref 135–145)

## 2020-06-02 PROCEDURE — 52356 CYSTO/URETERO W/LITHOTRIPSY: CPT | Mod: 50,58

## 2020-06-02 PROCEDURE — 50389 REMOVE RENAL TUBE W/FLUORO: CPT | Mod: RT,58

## 2020-06-02 PROCEDURE — 88300 SURGICAL PATH GROSS: CPT | Mod: 26

## 2020-06-02 PROCEDURE — 74420 UROGRAPHY RTRGR +-KUB: CPT | Mod: 26

## 2020-06-02 RX ORDER — HEPARIN SODIUM 5000 [USP'U]/ML
5000 INJECTION INTRAVENOUS; SUBCUTANEOUS EVERY 8 HOURS
Refills: 0 | Status: DISCONTINUED | OUTPATIENT
Start: 2020-06-02 | End: 2020-06-03

## 2020-06-02 RX ORDER — FENTANYL CITRATE 50 UG/ML
50 INJECTION INTRAVENOUS
Refills: 0 | Status: DISCONTINUED | OUTPATIENT
Start: 2020-06-02 | End: 2020-06-02

## 2020-06-02 RX ORDER — SODIUM CHLORIDE 9 MG/ML
1000 INJECTION, SOLUTION INTRAVENOUS
Refills: 0 | Status: DISCONTINUED | OUTPATIENT
Start: 2020-06-02 | End: 2020-06-03

## 2020-06-02 RX ORDER — SENNA PLUS 8.6 MG/1
2 TABLET ORAL AT BEDTIME
Refills: 0 | Status: DISCONTINUED | OUTPATIENT
Start: 2020-06-02 | End: 2020-06-03

## 2020-06-02 RX ORDER — FENTANYL CITRATE 50 UG/ML
25 INJECTION INTRAVENOUS
Refills: 0 | Status: DISCONTINUED | OUTPATIENT
Start: 2020-06-02 | End: 2020-06-02

## 2020-06-02 RX ORDER — CEFAZOLIN SODIUM 1 G
1000 VIAL (EA) INJECTION EVERY 8 HOURS
Refills: 0 | Status: DISCONTINUED | OUTPATIENT
Start: 2020-06-02 | End: 2020-06-03

## 2020-06-02 RX ORDER — ONDANSETRON 8 MG/1
4 TABLET, FILM COATED ORAL ONCE
Refills: 0 | Status: DISCONTINUED | OUTPATIENT
Start: 2020-06-02 | End: 2020-06-02

## 2020-06-02 RX ORDER — LOSARTAN POTASSIUM 100 MG/1
25 TABLET, FILM COATED ORAL DAILY
Refills: 0 | Status: DISCONTINUED | OUTPATIENT
Start: 2020-06-02 | End: 2020-06-03

## 2020-06-02 RX ADMIN — HEPARIN SODIUM 5000 UNIT(S): 5000 INJECTION INTRAVENOUS; SUBCUTANEOUS at 21:10

## 2020-06-02 RX ADMIN — Medication 100 MILLIGRAM(S): at 05:00

## 2020-06-02 RX ADMIN — LOSARTAN POTASSIUM 25 MILLIGRAM(S): 100 TABLET, FILM COATED ORAL at 05:00

## 2020-06-02 RX ADMIN — SODIUM CHLORIDE 75 MILLILITER(S): 9 INJECTION, SOLUTION INTRAVENOUS at 16:32

## 2020-06-02 RX ADMIN — HEPARIN SODIUM 5000 UNIT(S): 5000 INJECTION INTRAVENOUS; SUBCUTANEOUS at 05:00

## 2020-06-02 RX ADMIN — Medication 100 MILLIGRAM(S): at 21:10

## 2020-06-02 NOTE — PROGRESS NOTE ADULT - ASSESSMENT
A/P: 60yo M s/p R. PCNL 1:1:min, nephroureteral catheter (5/29).    - AM labs reviewed  - continue Ancef  - UCx/Stone Cx negative  - continue to monitor color/back  - Keep ortez catheter  - OR today for 2nd stage/L PCNL  - repeat COVID swab 6/1 negative  - will post op check after procedure

## 2020-06-02 NOTE — PROGRESS NOTE ADULT - SUBJECTIVE AND OBJECTIVE BOX
Subjective: Pt seen and examined at bedside by  team. No overnight events. No complaints. Pain is controlled. Denies fever/chills, chest pain, SOB, abd pain, N/V, or other urologic complaint.    Objective    Vital signs  T(F): , Max: 99.1 (06-02-20 @ 00:43)  HR: 76 (06-02-20 @ 04:54)  BP: 123/76 (06-02-20 @ 04:54)  SpO2: 96% (06-02-20 @ 04:54)    Output     OUT:    Indwelling Catheter - Urethral: 1275 mL    Nephrostomy Tube: 2800 mL  Total OUT: 4075 mL    Total NET: -4075 mL          Gen: NAD  Abd: Soft, nontender, nondistended  Back: R flank dressing C/D/I. R NT in place with clear slightly blood tinged output.   : No suprapubic tenderness. Wilkes secured with clear ourput    Labs      06-02 @ 07:10    WBC --    / Hct --    / SCr 1.16     06-01 @ 10:20    WBC 14.31 / Hct 34.4  / SCr --

## 2020-06-02 NOTE — BRIEF OPERATIVE NOTE - NSICDXBRIEFPOSTOP_GEN_ALL_CORE_FT
POST-OP DIAGNOSIS:  Bilateral nephrolithiasis 29-May-2020 12:35:47  Justino Echevarria
POST-OP DIAGNOSIS:  Bilateral nephrolithiasis 29-May-2020 12:35:47  Justino Echevarria

## 2020-06-02 NOTE — BRIEF OPERATIVE NOTE - NSICDXBRIEFPREOP_GEN_ALL_CORE_FT
PRE-OP DIAGNOSIS:  Bilateral nephrolithiasis 29-May-2020 12:35:50  Justino Echevarria
PRE-OP DIAGNOSIS:  Bilateral nephrolithiasis 29-May-2020 12:35:50  Justino Echevarria

## 2020-06-02 NOTE — BRIEF OPERATIVE NOTE - OPERATION/FINDINGS
1. Bilateral renal stones lasered and fragmented, extracted through ureteral access sheath  2. Mucosal layer overlying multiple stones required laser to open  3. 7F x 24 cm L stent deployed  Dictation: [id number]

## 2020-06-02 NOTE — BRIEF OPERATIVE NOTE - NSICDXBRIEFPROCEDURE_GEN_ALL_CORE_FT
PROCEDURES:  Cystoscopy with ureteroscopic laser lithotripsy and insertion of stent 02-Jun-2020 15:47:22  Sae Woodard
PROCEDURES:  Nephrolithotomy, percutaneous, with C-arm fluoroscopic guidance 29-May-2020 12:36:55  Justino Echevarria

## 2020-06-02 NOTE — PROGRESS NOTE ADULT - SUBJECTIVE AND OBJECTIVE BOX
Post-operative Check    SUBJECTIVE: No acute events in the immediate post-operative period. Pain well controlled.  Reports some bladder discomfort but tolerable.  Denies n/v, f/c, cp/sob.     OBJECTIVE:  T(C): 36.3 (06-02-20 @ 17:00), Max: 37.3 (06-02-20 @ 00:43)  HR: 73 (06-02-20 @ 17:00) (60 - 100)  BP: 126/70 (06-02-20 @ 17:00) (113/71 - 131/70)  RR: 16 (06-02-20 @ 17:00) (16 - 19)  SpO2: 93% (06-02-20 @ 17:00) (93% - 100%)      06-01-20 @ 07:01  -  06-02-20 @ 07:00  --------------------------------------------------------  IN: 2670 mL / OUT: 4076 mL / NET: -1406 mL    06-02-20 @ 07:01  -  06-02-20 @ 17:50  --------------------------------------------------------  IN: 0 mL / OUT: 550 mL / NET: -550 mL        Physical Exam:   - Constitutional: AOx3, NAD  - CV: normotensive, regular rate   - Respiratory: nonlabored  - Abdomen: soft, nontender, nondistended, NT site clean/dry, dressing placed  - : nonpalpable bladder        ASSESSMENT:   ZHEN CALDERON is a 59y Male POD#0 from 2nd stage bilateral URS, RPG, laser lithotripsy, stone basket extraction, left ureteral stent placement.  Doing well, no concerns.     PLAN:  - Due to void tonight  - AM CBC/BMP  - F/U ORCx / Ancef  - AM CT for residual stone  - possible discharge in AM

## 2020-06-03 ENCOUNTER — TRANSCRIPTION ENCOUNTER (OUTPATIENT)
Age: 60
End: 2020-06-03

## 2020-06-03 VITALS
RESPIRATION RATE: 18 BRPM | HEART RATE: 62 BPM | SYSTOLIC BLOOD PRESSURE: 115 MMHG | OXYGEN SATURATION: 94 % | DIASTOLIC BLOOD PRESSURE: 70 MMHG | TEMPERATURE: 99 F

## 2020-06-03 LAB
ANION GAP SERPL CALC-SCNC: 8 MMOL/L — SIGNIFICANT CHANGE UP (ref 5–17)
BUN SERPL-MCNC: 14 MG/DL — SIGNIFICANT CHANGE UP (ref 7–23)
CALCIUM SERPL-MCNC: 8.7 MG/DL — SIGNIFICANT CHANGE UP (ref 8.4–10.5)
CHLORIDE SERPL-SCNC: 103 MMOL/L — SIGNIFICANT CHANGE UP (ref 96–108)
CO2 SERPL-SCNC: 27 MMOL/L — SIGNIFICANT CHANGE UP (ref 22–31)
CREAT SERPL-MCNC: 1.03 MG/DL — SIGNIFICANT CHANGE UP (ref 0.5–1.3)
GLUCOSE SERPL-MCNC: 131 MG/DL — HIGH (ref 70–99)
HCT VFR BLD CALC: 32.3 % — LOW (ref 39–50)
HGB BLD-MCNC: 10.8 G/DL — LOW (ref 13–17)
MCHC RBC-ENTMCNC: 27.6 PG — SIGNIFICANT CHANGE UP (ref 27–34)
MCHC RBC-ENTMCNC: 33.4 GM/DL — SIGNIFICANT CHANGE UP (ref 32–36)
MCV RBC AUTO: 82.4 FL — SIGNIFICANT CHANGE UP (ref 80–100)
NRBC # BLD: 0 /100 WBCS — SIGNIFICANT CHANGE UP (ref 0–0)
PLATELET # BLD AUTO: 303 K/UL — SIGNIFICANT CHANGE UP (ref 150–400)
POTASSIUM SERPL-MCNC: 3.8 MMOL/L — SIGNIFICANT CHANGE UP (ref 3.5–5.3)
POTASSIUM SERPL-SCNC: 3.8 MMOL/L — SIGNIFICANT CHANGE UP (ref 3.5–5.3)
RBC # BLD: 3.92 M/UL — LOW (ref 4.2–5.8)
RBC # FLD: 14.6 % — HIGH (ref 10.3–14.5)
SODIUM SERPL-SCNC: 138 MMOL/L — SIGNIFICANT CHANGE UP (ref 135–145)
WBC # BLD: 14.56 K/UL — HIGH (ref 3.8–10.5)
WBC # FLD AUTO: 14.56 K/UL — HIGH (ref 3.8–10.5)

## 2020-06-03 PROCEDURE — 76000 FLUOROSCOPY <1 HR PHYS/QHP: CPT

## 2020-06-03 PROCEDURE — C1887: CPT

## 2020-06-03 PROCEDURE — C1769: CPT

## 2020-06-03 PROCEDURE — 88300 SURGICAL PATH GROSS: CPT

## 2020-06-03 PROCEDURE — 74176 CT ABD & PELVIS W/O CONTRAST: CPT | Mod: 26

## 2020-06-03 PROCEDURE — 74176 CT ABD & PELVIS W/O CONTRAST: CPT

## 2020-06-03 PROCEDURE — C1889: CPT

## 2020-06-03 PROCEDURE — 82365 CALCULUS SPECTROSCOPY: CPT

## 2020-06-03 PROCEDURE — C1766: CPT

## 2020-06-03 PROCEDURE — 71045 X-RAY EXAM CHEST 1 VIEW: CPT

## 2020-06-03 PROCEDURE — C2625: CPT

## 2020-06-03 PROCEDURE — 87635 SARS-COV-2 COVID-19 AMP PRB: CPT

## 2020-06-03 PROCEDURE — 86901 BLOOD TYPING SEROLOGIC RH(D): CPT

## 2020-06-03 PROCEDURE — 86900 BLOOD TYPING SEROLOGIC ABO: CPT

## 2020-06-03 PROCEDURE — 85027 COMPLETE CBC AUTOMATED: CPT

## 2020-06-03 PROCEDURE — 87070 CULTURE OTHR SPECIMN AEROBIC: CPT

## 2020-06-03 PROCEDURE — C1726: CPT

## 2020-06-03 PROCEDURE — 80048 BASIC METABOLIC PNL TOTAL CA: CPT

## 2020-06-03 PROCEDURE — C2617: CPT

## 2020-06-03 PROCEDURE — C1758: CPT

## 2020-06-03 PROCEDURE — 87086 URINE CULTURE/COLONY COUNT: CPT

## 2020-06-03 RX ORDER — SENNA PLUS 8.6 MG/1
2 TABLET ORAL
Qty: 0 | Refills: 0 | DISCHARGE
Start: 2020-06-03

## 2020-06-03 RX ORDER — ACETAMINOPHEN 500 MG
650 TABLET ORAL EVERY 6 HOURS
Refills: 0 | Status: DISCONTINUED | OUTPATIENT
Start: 2020-06-03 | End: 2020-06-03

## 2020-06-03 RX ADMIN — Medication 100 MILLIGRAM(S): at 06:31

## 2020-06-03 RX ADMIN — HEPARIN SODIUM 5000 UNIT(S): 5000 INJECTION INTRAVENOUS; SUBCUTANEOUS at 06:31

## 2020-06-03 NOTE — PROGRESS NOTE ADULT - SUBJECTIVE AND OBJECTIVE BOX
Post-operative Check    SUBJECTIVE: No events overnight, vitals stable.  Voiding without issue after procedure, for AM CT scan.       OBJECTIVE:  Vital Signs Last 24 Hrs  T(C): 36.9 (03 Jun 2020 06:28), Max: 37.2 (02 Jun 2020 20:17)  T(F): 98.5 (03 Jun 2020 06:28), Max: 98.9 (02 Jun 2020 20:17)  HR: 77 (03 Jun 2020 06:28) (65 - 94)  BP: 116/62 (03 Jun 2020 06:28) (113/65 - 135/68)  BP(mean): 88 (02 Jun 2020 18:23) (87 - 94)  RR: 18 (03 Jun 2020 06:28) (16 - 19)  SpO2: 95% (03 Jun 2020 06:28) (93% - 100%)    06-02-20 @ 07:01  -  06-03-20 @ 07:00  --------------------------------------------------------  IN: 275 mL / OUT: 2225 mL / NET: -1950 mL      Physical Exam:   - Constitutional: AOx3, NAD  - CV: normotensive, regular rate   - Respiratory: nonlabored  - Abdomen: soft, nontender, nondistended, NT site clean/dry  - : nonpalpable bladder    Labs:  CBC (06-03 @ 07:55)                              10.8<L>                         14.56<H>  )----------------(  303        --    % Neutrophils, --    % Lymphocytes, ANC: --                                  32.3<L>                BMP (06-03 @ 07:56)             138     |  103     |  14    		Ca++ --      Ca 8.7                ---------------------------------( 131<H>		Mg --                 3.8     |  27      |  1.03  			Ph --      BMP (06-02 @ 07:10)             141     |  103     |  16    		Ca++ --      Ca 9.1                ---------------------------------( 125<H>		Mg --                 3.7     |  26      |  1.16  			Ph --                -> .Urine Catheterized Culture (05-29 @ 18:40)     NG    NG    No growth    -> Skin right kidney stone Culture (05-29 @ 17:34)     NG    NG    No growth

## 2020-06-03 NOTE — DISCHARGE NOTE PROVIDER - NSDCCPTREATMENT_GEN_ALL_CORE_FT
PRINCIPAL PROCEDURE  Procedure: Cystoscopy with percutaneous right nephrolithotomy  Findings and Treatment:       SECONDARY PROCEDURE  Procedure: Cystoscopy with ureteroscopic laser lithotripsy and insertion of stent  Findings and Treatment:

## 2020-06-03 NOTE — DISCHARGE NOTE NURSING/CASE MANAGEMENT/SOCIAL WORK - PATIENT PORTAL LINK FT
You can access the FollowMyHealth Patient Portal offered by [facility] by registering at the following website: [email or web address]. By joining [facility]’s FollowMyHealth portal, you will also be able to view your health information using other applications (apps) compatible with our system.

## 2020-06-03 NOTE — DISCHARGE NOTE PROVIDER - NSDCMRMEDTOKEN_GEN_ALL_CORE_FT
amoxicillin-clavulanate 875 mg-125 mg oral tablet: 1 tab(s) orally every 12 hours  losartan 25 mg oral tablet: 1 tab(s) orally once a day (at bedtime)  senna oral tablet: 2 tab(s) orally once a day (at bedtime), As needed, Constipation  Tylenol Regular Strength 325 mg oral tablet: 3 tab(s) orally every 6 hours, As needed, Mild Pain (1 - 3)

## 2020-06-03 NOTE — DISCHARGE NOTE PROVIDER - HOSPITAL COURSE
60 yo M with h/o HTN, Renal calculus c/o right flank & abdominal pain since 4/23/2020. Pt had ED admission -s/p renal sonogram revealed renal calculi-underwent cystoscopy, right renal stent insertion. Pt presents to PST for pre op evaluation for cystoscopy, right ureteroscopy, possible right percutaneous stone extraction, stent placement.        Patient went to OR on 5/28 for R. PCNL (1 stick: 1 dilation: minimal ebl) left with nephroureteral stent and catheter.  Over the weekend, ortez left in place for false passage/stricture seen in OR, no complications, colors appropriate.  On 6/2, patient returned to OR for R. nephrostomy tube removal, bilateral URS which revealed significant stone burden embedded within papilla, laser lithotripsy, stone basket extraction, b/l RPG, stent placement.  He tolerated procedure well and POD#1, CT performed to show residual stone, overall improved.          At the time of discharge, the patient remained afebrile, voiding without issue, tolerating PO, pain controlled and comfortable with discharge with outpatient f/u and removal of stent.

## 2020-06-03 NOTE — DISCHARGE NOTE PROVIDER - NSDCFUADDINST_GEN_ALL_CORE_FT
There may be leakage from your back, you may replace the gauze and tape as needed. The hole will close in a few days. Sitting upright will help stop the leakage.     Your ureteral stent is on a string attached with an adhesive bandage. Do not tug on or pull on string, do not remove bandage. The stent will be removed at your follow up appointment.     Antibiotics have been sent to your pharmacy, please take for full additional course and use as directed.      You may have intermittent pink tinged urine and slight flank pain when you urinate.  This is normal and due to the stent in your ureter.   If your urine becomes bright red or with clots, please call the office.     Please call Dr. Sanchez's office to confirm/schedule follow up appointment for stent removal.      You may take Tylenol as needed for pain control.

## 2020-06-03 NOTE — DISCHARGE NOTE PROVIDER - NSDCCPCAREPLAN_GEN_ALL_CORE_FT
PRINCIPAL DISCHARGE DIAGNOSIS  Diagnosis: Bilateral nephrolithiasis  Assessment and Plan of Treatment:

## 2020-06-03 NOTE — DISCHARGE NOTE PROVIDER - CARE PROVIDER_API CALL
Thor Sanchez  UROLOGY  [address] [place], [place] [address]  Phone: [phone]  Fax: [phone]  Follow Up Time:

## 2020-06-03 NOTE — PROGRESS NOTE ADULT - ASSESSMENT
ASSESSMENT:   ZHEN CALDERON is a 59y Male POD#1 from 2nd stage bilateral URS, RPG, laser lithotripsy, stone basket extraction, left ureteral stent placement.  Doing well, no concerns.     PLAN:  - AM labs reviewed  - OR Cx/SCx No growth  - Ancef  - AM CT pending  - Possible discharge home w Augmentin

## 2020-06-04 LAB — NIDUS STONE QN: SIGNIFICANT CHANGE UP

## 2020-06-08 ENCOUNTER — OUTPATIENT (OUTPATIENT)
Dept: OUTPATIENT SERVICES | Facility: HOSPITAL | Age: 60
LOS: 1 days | End: 2020-06-08
Payer: COMMERCIAL

## 2020-06-08 ENCOUNTER — APPOINTMENT (OUTPATIENT)
Dept: UROLOGY | Facility: CLINIC | Age: 60
End: 2020-06-08
Payer: COMMERCIAL

## 2020-06-08 VITALS — TEMPERATURE: 98.3 F

## 2020-06-08 DIAGNOSIS — Z98.890 OTHER SPECIFIED POSTPROCEDURAL STATES: Chronic | ICD-10-CM

## 2020-06-08 LAB — SURGICAL PATHOLOGY STUDY: SIGNIFICANT CHANGE UP

## 2020-06-08 PROCEDURE — 52310 CYSTOSCOPY AND TREATMENT: CPT | Mod: 58,52

## 2020-06-08 PROCEDURE — 52310 CYSTOSCOPY AND TREATMENT: CPT

## 2020-06-08 PROCEDURE — 99024 POSTOP FOLLOW-UP VISIT: CPT

## 2020-06-08 NOTE — HISTORY OF PRESENT ILLNESS
[FreeTextEntry1] : 60y/o man\par s/p RIGHT PCNL and bilateral URS for kidney and ureteral stones in horseshoes kidneys. Surgery dated were 5/29/20 and  6/2/20.\par stone analysis pending\par \par \par PROCEDURE NOTE\par LEFT Ureteral stent removed intact and in it's entirety using attached string tether. \par Pt tolerated well.\par Post stent removal pain/colic cautions advised.\par

## 2020-06-08 NOTE — PHYSICAL EXAM
[General Appearance - Well Developed] : well developed [General Appearance - Well Nourished] : well nourished [Normal Appearance] : normal appearance [Well Groomed] : well groomed [General Appearance - In No Acute Distress] : no acute distress [Abdomen Soft] : soft [Abdomen Tenderness] : non-tender [Costovertebral Angle Tenderness] : no ~M costovertebral angle tenderness [Urethral Meatus] : meatus normal [Penis Abnormality] : normal circumcised penis [Urinary Bladder Findings] : the bladder was normal on palpation [Scrotum] : the scrotum was normal [Testes Tenderness] : no tenderness of the testes [Skin Color & Pigmentation] : normal skin color and pigmentation [Oriented To Time, Place, And Person] : oriented to person, place, and time [Affect] : the affect was normal [Mood] : the mood was normal [Not Anxious] : not anxious [Normal Station and Gait] : the gait and station were normal for the patient's age [Inguinal Lymph Nodes Enlarged Bilaterally] : inguinal

## 2020-06-09 LAB — NIDUS STONE QN: SIGNIFICANT CHANGE UP

## 2020-06-09 RX ORDER — LOSARTAN POTASSIUM 25 MG/1
25 TABLET, FILM COATED ORAL
Qty: 30 | Refills: 0 | Status: ACTIVE | COMMUNITY
Start: 2020-06-09

## 2020-06-10 DIAGNOSIS — Q63.1 LOBULATED, FUSED AND HORSESHOE KIDNEY: ICD-10-CM

## 2020-06-10 DIAGNOSIS — N20.0 CALCULUS OF KIDNEY: ICD-10-CM

## 2020-08-10 ENCOUNTER — APPOINTMENT (OUTPATIENT)
Dept: UROLOGY | Facility: CLINIC | Age: 60
End: 2020-08-10
Payer: COMMERCIAL

## 2020-08-10 VITALS
WEIGHT: 179 LBS | BODY MASS INDEX: 27.13 KG/M2 | RESPIRATION RATE: 17 BRPM | SYSTOLIC BLOOD PRESSURE: 156 MMHG | DIASTOLIC BLOOD PRESSURE: 73 MMHG | HEART RATE: 55 BPM | HEIGHT: 68 IN

## 2020-08-10 VITALS — TEMPERATURE: 97.6 F

## 2020-08-10 PROCEDURE — 99213 OFFICE O/P EST LOW 20 MIN: CPT | Mod: 25

## 2020-08-10 NOTE — PHYSICAL EXAM
[General Appearance - Well Developed] : well developed [Abdomen Soft] : soft [Skin Color & Pigmentation] : normal skin color and pigmentation [] : no respiratory distress [Heart Rate And Rhythm] : Heart rate and rhythm were normal [Respiration, Rhythm And Depth] : normal respiratory rhythm and effort [Exaggerated Use Of Accessory Muscles For Inspiration] : no accessory muscle use [Oriented To Time, Place, And Person] : oriented to person, place, and time [Normal Station and Gait] : the gait and station were normal for the patient's age [No Focal Deficits] : no focal deficits

## 2020-08-23 NOTE — ASSESSMENT
[FreeTextEntry1] : Maintain increase fluids intake\par Increase citrus fruits and juices\par Low salt diet\par Reduce animal proteins intake\par \par Consider k-citrate if citrate not improved on next 24 hr urine\par 24 hr urine and renal sono before next visit\par f/u 4 months\par \par \par \par

## 2020-08-23 NOTE — HISTORY OF PRESENT ILLNESS
[None] : None [FreeTextEntry1] : 59 yr old male presents to follow up for kidney stones. Pt frequency/urgency, dysuria, hematuria, or abd/ flank pain. \par \par s/p RIGHT PCNL on  5/29/20 and bilateral URS on 6/2/20 for kidney and ureteral stones in horseshoe kidney.\par \par stones- 100% calcium oxalate monohydrate\par \par UNRELATED TO SURGERY:\par 24 hr urines: vol ok, low citrate, high sodium on one collection, mild high PCR [Urinary Urgency] : no urinary urgency [Urinary Frequency] : no urinary frequency [Dysuria] : no dysuria [Hematuria - Gross] : no gross hematuria

## 2020-09-30 NOTE — H&P PST ADULT - BREASTS
No masses; no nipple discharge [] : No [de-identified] : Pain scale:  0/10 - Patient reports no c/o pains or discomforts at present.

## 2020-12-07 ENCOUNTER — OUTPATIENT (OUTPATIENT)
Dept: OUTPATIENT SERVICES | Facility: HOSPITAL | Age: 60
LOS: 1 days | End: 2020-12-07
Payer: COMMERCIAL

## 2020-12-07 ENCOUNTER — APPOINTMENT (OUTPATIENT)
Dept: UROLOGY | Facility: CLINIC | Age: 60
End: 2020-12-07
Payer: COMMERCIAL

## 2020-12-07 VITALS
WEIGHT: 184 LBS | TEMPERATURE: 96.4 F | DIASTOLIC BLOOD PRESSURE: 80 MMHG | SYSTOLIC BLOOD PRESSURE: 159 MMHG | RESPIRATION RATE: 17 BRPM | HEART RATE: 60 BPM | BODY MASS INDEX: 27.89 KG/M2 | HEIGHT: 68 IN

## 2020-12-07 VITALS
TEMPERATURE: 97.6 F | SYSTOLIC BLOOD PRESSURE: 125 MMHG | RESPIRATION RATE: 17 BRPM | DIASTOLIC BLOOD PRESSURE: 73 MMHG | HEART RATE: 91 BPM | WEIGHT: 175 LBS | BODY MASS INDEX: 34.36 KG/M2 | HEIGHT: 60 IN

## 2020-12-07 DIAGNOSIS — R35.0 FREQUENCY OF MICTURITION: ICD-10-CM

## 2020-12-07 DIAGNOSIS — R82.6 ABNORMAL URINE LEVELS OF SUBSTANCES CHIEFLY NONMEDICINAL AS TO SOURCE: ICD-10-CM

## 2020-12-07 DIAGNOSIS — Z98.890 OTHER SPECIFIED POSTPROCEDURAL STATES: Chronic | ICD-10-CM

## 2020-12-07 DIAGNOSIS — R82.991 HYPOCITRATURIA: ICD-10-CM

## 2020-12-07 DIAGNOSIS — N20.0 CALCULUS OF KIDNEY: ICD-10-CM

## 2020-12-07 DIAGNOSIS — Q63.1 LOBULATED, FUSED AND HORSESHOE KIDNEY: ICD-10-CM

## 2020-12-07 PROCEDURE — 99214 OFFICE O/P EST MOD 30 MIN: CPT | Mod: 25

## 2020-12-07 PROCEDURE — 76775 US EXAM ABDO BACK WALL LIM: CPT

## 2020-12-07 PROCEDURE — 76775 US EXAM ABDO BACK WALL LIM: CPT | Mod: 26

## 2020-12-07 PROCEDURE — 99072 ADDL SUPL MATRL&STAF TM PHE: CPT

## 2020-12-07 NOTE — PHYSICAL EXAM
[General Appearance - Well Developed] : well developed [Abdomen Soft] : soft [Skin Color & Pigmentation] : normal skin color and pigmentation [Heart Rate And Rhythm] : Heart rate and rhythm were normal [] : no respiratory distress [Oriented To Time, Place, And Person] : oriented to person, place, and time [Normal Station and Gait] : the gait and station were normal for the patient's age [No Focal Deficits] : no focal deficits

## 2020-12-20 NOTE — HISTORY OF PRESENT ILLNESS
[None] : None [FreeTextEntry1] : 60 yr old man\par Horseshoe kidney\par Hx of RIGHT PCNL on 5/29/20 and bilateral URS on 6/2/20 for kidney and ureteral stones in horseshoe kidney.\par stones- 100% calcium oxalate monohydrate\par \par Pt denies dysuria, hematuria, or abd/ flank pain. \par \par Renal US-  Horseshoe kidneys.  2.1mm calcification in the lower pole of the right kidney. Both kidneys are normal in size and echogenicity, hydronephrosis or solid masses. \par \par 24 hr urine- Improved sodium and citrate. Good volume. Increased animal proteins, oxalate, and calcium.

## 2020-12-20 NOTE — ASSESSMENT
[FreeTextEntry1] : Continue drinking adequate fluids. Decrease animal proteins portions and salt intake. \par \par RTC in 6 months with Renal US and 24 hr urine

## 2021-03-06 NOTE — ED ADULT TRIAGE NOTE - RESPIRATORY RATE (BREATHS/MIN)
18

## 2021-04-13 ENCOUNTER — APPOINTMENT (OUTPATIENT)
Dept: DISASTER EMERGENCY | Facility: OTHER | Age: 61
End: 2021-04-13

## 2021-06-14 ENCOUNTER — OUTPATIENT (OUTPATIENT)
Dept: OUTPATIENT SERVICES | Facility: HOSPITAL | Age: 61
LOS: 1 days | End: 2021-06-14
Payer: COMMERCIAL

## 2021-06-14 ENCOUNTER — APPOINTMENT (OUTPATIENT)
Dept: UROLOGY | Facility: CLINIC | Age: 61
End: 2021-06-14
Payer: COMMERCIAL

## 2021-06-14 VITALS
RESPIRATION RATE: 16 BRPM | BODY MASS INDEX: 28.79 KG/M2 | HEART RATE: 62 BPM | WEIGHT: 190 LBS | TEMPERATURE: 98 F | DIASTOLIC BLOOD PRESSURE: 88 MMHG | SYSTOLIC BLOOD PRESSURE: 167 MMHG | HEIGHT: 68 IN

## 2021-06-14 DIAGNOSIS — Q63.1 LOBULATED, FUSED AND HORSESHOE KIDNEY: ICD-10-CM

## 2021-06-14 DIAGNOSIS — R82.994 HYPERCALCIURIA: ICD-10-CM

## 2021-06-14 DIAGNOSIS — R82.6 ABNORMAL URINE LEVELS OF SUBSTANCES CHIEFLY NONMEDICINAL AS TO SOURCE: ICD-10-CM

## 2021-06-14 DIAGNOSIS — N20.0 CALCULUS OF KIDNEY: ICD-10-CM

## 2021-06-14 DIAGNOSIS — R35.0 FREQUENCY OF MICTURITION: ICD-10-CM

## 2021-06-14 DIAGNOSIS — E87.0 HYPEROSMOLALITY AND HYPERNATREMIA: ICD-10-CM

## 2021-06-14 DIAGNOSIS — R82.993 HYPERURICOSURIA: ICD-10-CM

## 2021-06-14 DIAGNOSIS — R82.991 HYPOCITRATURIA: ICD-10-CM

## 2021-06-14 DIAGNOSIS — Z98.890 OTHER SPECIFIED POSTPROCEDURAL STATES: Chronic | ICD-10-CM

## 2021-06-14 PROCEDURE — 99214 OFFICE O/P EST MOD 30 MIN: CPT

## 2021-06-14 PROCEDURE — 76775 US EXAM ABDO BACK WALL LIM: CPT | Mod: 26

## 2021-06-14 PROCEDURE — 76775 US EXAM ABDO BACK WALL LIM: CPT

## 2021-06-14 RX ORDER — HYDROMORPHONE HYDROCHLORIDE 2 MG/1
2 TABLET ORAL EVERY 6 HOURS
Qty: 10 | Refills: 0 | Status: COMPLETED | COMMUNITY
Start: 2020-05-19 | End: 2021-06-14

## 2021-06-14 RX ORDER — AMOXICILLIN AND CLAVULANATE POTASSIUM 875; 125 MG/1; MG/1
875-125 TABLET, COATED ORAL
Qty: 20 | Refills: 0 | Status: COMPLETED | COMMUNITY
Start: 2020-05-23 | End: 2021-06-14

## 2021-07-02 NOTE — HISTORY OF PRESENT ILLNESS
[None] : None [FreeTextEntry1] : 60 yr old man\par Horseshoe kidney\par Hx of RIGHT PCNL on 5/29/20 and bilateral URS on 6/2/20 for kidney and ureteral stones in horseshoe kidney.\par stones- 100% calcium oxalate monohydrate\par \par \par Renal sono: kidneys not well images due to bowel gas. No obvious large stones\par \par 24 hr urine: good vol, high calcium and sodium, pH 6.48, high UA, high UUN and PCR

## 2021-07-02 NOTE — ASSESSMENT
[FreeTextEntry1] : Increase fluids intake\par Reduce salt in diet\par Reduce animal protein portions\par \par 24 hr urine and renal sono before next visit\par may add HCTZ if hyper calciuria persists\par f/u 6 months

## 2021-12-15 ENCOUNTER — APPOINTMENT (OUTPATIENT)
Dept: UROLOGY | Facility: CLINIC | Age: 61
End: 2021-12-15

## 2022-02-07 ENCOUNTER — OUTPATIENT (OUTPATIENT)
Dept: OUTPATIENT SERVICES | Facility: HOSPITAL | Age: 62
LOS: 1 days | End: 2022-02-07
Payer: COMMERCIAL

## 2022-02-07 ENCOUNTER — APPOINTMENT (OUTPATIENT)
Dept: UROLOGY | Facility: CLINIC | Age: 62
End: 2022-02-07
Payer: COMMERCIAL

## 2022-02-07 VITALS
HEART RATE: 56 BPM | HEIGHT: 68 IN | RESPIRATION RATE: 17 BRPM | TEMPERATURE: 97.3 F | DIASTOLIC BLOOD PRESSURE: 84 MMHG | SYSTOLIC BLOOD PRESSURE: 146 MMHG | BODY MASS INDEX: 28.79 KG/M2 | WEIGHT: 190 LBS

## 2022-02-07 DIAGNOSIS — Z98.890 OTHER SPECIFIED POSTPROCEDURAL STATES: Chronic | ICD-10-CM

## 2022-02-07 PROCEDURE — 76775 US EXAM ABDO BACK WALL LIM: CPT | Mod: 26

## 2022-02-07 PROCEDURE — 99214 OFFICE O/P EST MOD 30 MIN: CPT

## 2022-02-07 PROCEDURE — 76775 US EXAM ABDO BACK WALL LIM: CPT

## 2022-02-26 NOTE — ASSESSMENT
[FreeTextEntry1] : Increase fluids intake\par Low-salt diet\par Reduce animal protein portions\par \par 24 hr urine and renal sono before next visit\par may add HCTZ if hyper calciuria persists\par f/u 6 months

## 2022-02-26 NOTE — PHYSICAL EXAM
[General Appearance - Well Developed] : well developed [Skin Color & Pigmentation] : normal skin color and pigmentation [Abdomen Soft] : soft [Heart Rate And Rhythm] : Heart rate and rhythm were normal [] : no respiratory distress [Normal Station and Gait] : the gait and station were normal for the patient's age [Oriented To Time, Place, And Person] : oriented to person, place, and time [No Focal Deficits] : no focal deficits

## 2022-02-26 NOTE — HISTORY OF PRESENT ILLNESS
[FreeTextEntry1] : 61-year-old man\par Horseshoe kidney\par Hx of RIGHT PCNL on 5/29/20 and bilateral URS on 6/2/20 for kidney and ureteral stones in horseshoe kidney.\par stones- 100% calcium oxalate monohydrate\par \par \par Renal sono: No obvious large stones\par \par 24 hr urine: good vol, high calcium and sodium, pH 6.46, high uric acid, high UUN and PCR [None] : None

## 2022-03-01 DIAGNOSIS — R82.994 HYPERCALCIURIA: ICD-10-CM

## 2022-03-01 DIAGNOSIS — E87.0 HYPEROSMOLALITY AND HYPERNATREMIA: ICD-10-CM

## 2022-03-01 DIAGNOSIS — Q63.1 LOBULATED, FUSED AND HORSESHOE KIDNEY: ICD-10-CM

## 2022-03-01 DIAGNOSIS — R82.991 HYPOCITRATURIA: ICD-10-CM

## 2022-03-01 DIAGNOSIS — N20.0 CALCULUS OF KIDNEY: ICD-10-CM

## 2022-03-01 DIAGNOSIS — R82.6 ABNORMAL URINE LEVELS OF SUBSTANCES CHIEFLY NONMEDICINAL AS TO SOURCE: ICD-10-CM

## 2022-03-01 DIAGNOSIS — R82.993 HYPERURICOSURIA: ICD-10-CM

## 2022-05-09 ENCOUNTER — APPOINTMENT (OUTPATIENT)
Dept: UROLOGY | Facility: CLINIC | Age: 62
End: 2022-05-09

## 2022-10-24 ENCOUNTER — EMERGENCY (EMERGENCY)
Facility: HOSPITAL | Age: 62
LOS: 1 days | Discharge: HOME | End: 2022-10-24
Attending: EMERGENCY MEDICINE | Admitting: EMERGENCY MEDICINE

## 2022-10-24 VITALS
OXYGEN SATURATION: 100 % | RESPIRATION RATE: 18 BRPM | HEIGHT: 68 IN | DIASTOLIC BLOOD PRESSURE: 87 MMHG | SYSTOLIC BLOOD PRESSURE: 157 MMHG | HEART RATE: 57 BPM | TEMPERATURE: 98 F

## 2022-10-24 DIAGNOSIS — Z98.890 OTHER SPECIFIED POSTPROCEDURAL STATES: Chronic | ICD-10-CM

## 2022-10-24 PROCEDURE — 99284 EMERGENCY DEPT VISIT MOD MDM: CPT

## 2022-10-24 NOTE — ED ADULT TRIAGE NOTE - CHIEF COMPLAINT QUOTE
Pt c/o left knee pain and swelling X 2 weeks. States swelling goes up to groin. + Pedal pulses. Denies falls, trauma or injury. Ambulatory in triage. Pmhx: HTN

## 2022-10-25 PROCEDURE — 93971 EXTREMITY STUDY: CPT | Mod: 26,LT

## 2022-10-25 RX ORDER — IBUPROFEN 200 MG
600 TABLET ORAL ONCE
Refills: 0 | Status: COMPLETED | OUTPATIENT
Start: 2022-10-25 | End: 2022-10-25

## 2022-10-25 RX ORDER — ACETAMINOPHEN 500 MG
650 TABLET ORAL ONCE
Refills: 0 | Status: COMPLETED | OUTPATIENT
Start: 2022-10-25 | End: 2022-10-25

## 2022-10-25 RX ADMIN — Medication 650 MILLIGRAM(S): at 00:09

## 2022-10-25 NOTE — ED PROVIDER NOTE - NSFOLLOWUPINSTRUCTIONS_ED_ALL_ED_FT
Leg edema of unclear etiology.      It could be due to stretch or tear in one of the tough, fiber-like tissues (ligaments) in your body. This is caused by an injury to the area such as a twisting mechanism. Symptoms include pain, swelling, or bruising. Rest that area over the next several days and slowly resume activity when tolerated. Ice can help with swelling and pain.     SEEK IMMEDIATE MEDICAL CARE IF YOU HAVE ANY OF THE FOLLOWING SYMPTOMS: worsening pain, inability to move that body part, numbness or tingling.

## 2022-10-25 NOTE — ED PROVIDER NOTE - PATIENT PORTAL LINK FT
You can access the FollowMyHealth Patient Portal offered by [facility] by registering at the following website: [email or web address]. By joining [facility]’s FollowMyHealth portal, you will also be able to view your health information using other applications (apps) compatible with our system.

## 2022-10-25 NOTE — ED ADULT NURSE NOTE - CAS EDP DISCH TYPE
Patient seen and examined. No Additional bleeding reported.     AVSS  Mouth: no posterior pharyngeal bleeding noted  HEENT: left Nares: 7.5 CM RR in place, no bleeding noted  Right Nares Surgicel in place. no bleeding noted     Home

## 2022-10-25 NOTE — ED ADULT NURSE NOTE - OBJECTIVE STATEMENT
Received in intake 7 with c/o left knee pain and swelling for two weeks. Patient states swelling goes up to groin now. not in acute distress. Resp are even and non labored. Alert and oriented x4, safety maintained. Meds given as per order. await US

## 2022-10-25 NOTE — ED ADULT NURSE NOTE - NS_SISCREENINGSR_GEN_ALL_ED
Discharge Summary/Instructions for after Colonoscopy with Biopsy/Polypectomy    Gunnar Lou  6/22/2017  Xavi Mcpherson MD    Restrictions on Activity:    - Do not drive car or operate machinery until the day after the procedure.  - The following day: return to full activity including work.  - For 3 days: No heavy lifting, straining or running.  - Diet: Eat and drink normally unless instructed otherwise.    Treatment for Common Side Effects:  - Mild abdominal pain and bloating or excessive gas: rest, eat lightly and use a heating pad.     Symptoms to watch for and report to your physician:  1. Severe abdominal pain.  2. Fever within 24 hours after a procedure.  3. A large amount of rectal bleeding. (A small amount of blood from the rectum is not serious, especially if hemorrhoids are present.)  4. Because air was put into your colon during the procedure, expelling large amount of air from your rectum is normal.  5. You may not have a bowel movement for 1-3 days because of the colonoscopy prep. This is normal.  6. Go directly to the emergency room if you notice any of the following:     Chills and/or fever over 101   Persistent vomiting   Severe abdominal pain, other than gas cramps   Severe chest pain   Black, tarry stools   Any bleeding - exceeding one tablespoon    If you have any questions or problems, please call your Physician:    Xavi Mcpherson MD      Lab Results: Contact Physician's Office      If a complication or emergency situation arises and you are unable to reach your Physician - GO TO THE EMERGENCY ROOM.       Negative

## 2022-10-25 NOTE — ED PROVIDER NOTE - PHYSICAL EXAMINATION
Gen:  Well appearing in NAD.  Head:  NC/AT.  Resp: No distress.  Skin: warm and dry as visualized.  Neuro: alert and oriented to person, place, time.  Ext:  trace LLE edema no calf TTP  Vascular:  CR < 2 sec, DP/PT 2+, extremity warm.

## 2022-10-25 NOTE — ED PROVIDER NOTE - OBJECTIVE STATEMENT
Duration: 2 wks  Associated Sx: no SOB, no CP, no f/c  Context: no Hx DVT/PE, no recent immobilization or surgery.   Felt like his leg was swollen, so he wore an extremely tight compression bandage, which caused swelling in his thigh.   Better:  symptoms have resolved tonight after he put his feet up.

## 2022-11-28 ENCOUNTER — APPOINTMENT (OUTPATIENT)
Dept: VASCULAR SURGERY | Facility: CLINIC | Age: 62
End: 2022-11-28

## 2022-11-28 VITALS
SYSTOLIC BLOOD PRESSURE: 153 MMHG | TEMPERATURE: 97.8 F | DIASTOLIC BLOOD PRESSURE: 86 MMHG | HEIGHT: 68 IN | HEART RATE: 69 BPM | WEIGHT: 185 LBS | BODY MASS INDEX: 28.04 KG/M2

## 2022-11-28 DIAGNOSIS — R60.0 LOCALIZED EDEMA: ICD-10-CM

## 2022-11-28 PROCEDURE — 99204 OFFICE O/P NEW MOD 45 MIN: CPT

## 2022-11-28 PROCEDURE — 93979 VASCULAR STUDY: CPT

## 2022-11-28 PROCEDURE — 93971 EXTREMITY STUDY: CPT

## 2022-11-28 NOTE — HISTORY OF PRESENT ILLNESS
[FreeTextEntry1] : Patient is a 62-year-old gentleman with past medical history significant for hypertension, history of a horseshoe kidney with renal stone requiring open surgical extraction of the stone presenting to us for evaluation of left lower extremity edema.  The edema has been present for the past 4 to 5 months but recently had an episode of worsening edema the patient presented to the emergency room and underwent duplex of lower extremity which showed no evidence of DVT.\par \par Patient was referred to us from the emergency room for outpatient follow-up.\par \par No history of coronary artery disease.  No history of smoking.  No history of diabetes.\par \par At the time the patient had no complaint of shortness of breath or chest pain.\par \par The edema has been present on and off and improves with elevation.  Patient has had varicosities of lower extremity.\par \par No complaint of rest pain or claudication symptoms.

## 2022-11-28 NOTE — ASSESSMENT
[FreeTextEntry1] : Patient with left lower extremity edema and history of horseshoe kidney.\par \par No evidence of arterial insufficiency.\par \par Patient has no evidence of DVT.\par \par Patient has no evidence of iliac vein compression.\par \par No significant venous insufficiency of left lower extremity noted on duplex.  Patient has cluster varicosities.\par \par Recommend compression stockings and elevation.  Would recommend stab phlebectomy of varicosities if symptoms continue to worsen.\par \par This was all discussed with the patient in detail.

## 2022-11-28 NOTE — PHYSICAL EXAM
[JVD] : no jugular venous distention  [Normal Breath Sounds] : Normal breath sounds [Normal Heart Sounds] : normal heart sounds [2+] : left 2+ [Ankle Swelling (On Exam)] : present [Ankle Swelling On The Right] : mild [Varicose Veins Of Lower Extremities] : present [Ankle Swelling On The Left] : moderate [Abdomen Masses] : No abdominal masses

## 2023-02-13 ENCOUNTER — APPOINTMENT (OUTPATIENT)
Dept: UROLOGY | Facility: CLINIC | Age: 63
End: 2023-02-13
Payer: COMMERCIAL

## 2023-02-13 ENCOUNTER — OUTPATIENT (OUTPATIENT)
Dept: OUTPATIENT SERVICES | Facility: HOSPITAL | Age: 63
LOS: 1 days | End: 2023-02-13
Payer: COMMERCIAL

## 2023-02-13 VITALS
SYSTOLIC BLOOD PRESSURE: 160 MMHG | WEIGHT: 185 LBS | DIASTOLIC BLOOD PRESSURE: 80 MMHG | HEIGHT: 68 IN | HEART RATE: 62 BPM | RESPIRATION RATE: 17 BRPM | TEMPERATURE: 98.4 F | BODY MASS INDEX: 28.04 KG/M2

## 2023-02-13 DIAGNOSIS — Q63.1 LOBULATED, FUSED AND HORSESHOE KIDNEY: ICD-10-CM

## 2023-02-13 DIAGNOSIS — R35.0 FREQUENCY OF MICTURITION: ICD-10-CM

## 2023-02-13 DIAGNOSIS — Z98.890 OTHER SPECIFIED POSTPROCEDURAL STATES: Chronic | ICD-10-CM

## 2023-02-13 PROCEDURE — 99214 OFFICE O/P EST MOD 30 MIN: CPT

## 2023-02-13 PROCEDURE — 76775 US EXAM ABDO BACK WALL LIM: CPT | Mod: 26

## 2023-02-13 PROCEDURE — 76775 US EXAM ABDO BACK WALL LIM: CPT

## 2023-02-20 PROBLEM — Q63.1 HORSESHOE KIDNEY: Status: ACTIVE | Noted: 2020-05-13

## 2023-02-20 NOTE — HISTORY OF PRESENT ILLNESS
[None] : None [FreeTextEntry1] : 61-year-old man\par Horseshoe kidney\par Hx of RIGHT PCNL on 5/29/20 and bilateral URS on 6/2/20 for kidney and ureteral stones in horseshoe kidney.\par stones- 100% calcium oxalate monohydrate\par \par Renal sono: Multiple tiny stones right side\par \par 24 hr urine: All parameters within normal limits

## 2023-02-20 NOTE — ASSESSMENT
[FreeTextEntry1] : Increase fluids intake\par Low-salt diet\par Reduce animal protein portions\par \par 24 hr urine and renal sono before next visit\par f/u 6 months

## 2023-02-21 DIAGNOSIS — R82.991 HYPOCITRATURIA: ICD-10-CM

## 2023-02-21 DIAGNOSIS — Q63.1 LOBULATED, FUSED AND HORSESHOE KIDNEY: ICD-10-CM

## 2023-02-21 DIAGNOSIS — R82.994 HYPERCALCIURIA: ICD-10-CM

## 2023-02-21 DIAGNOSIS — R82.6 ABNORMAL URINE LEVELS OF SUBSTANCES CHIEFLY NONMEDICINAL AS TO SOURCE: ICD-10-CM

## 2023-02-21 DIAGNOSIS — N20.0 CALCULUS OF KIDNEY: ICD-10-CM

## 2023-02-21 DIAGNOSIS — E87.0 HYPEROSMOLALITY AND HYPERNATREMIA: ICD-10-CM

## 2023-05-16 NOTE — ASU PREOP CHECKLIST - AS TEMP SITE
This CM received a request for a home electronic bed that can weigh a patient.  [employer], the supplier of the current hospital bed (at home) does not provide this specialty bed. [employer] ([phone]) does not supply this specialty bed.  [facility] does not supply this specialty bed.  This information will be shared with the patient's family.    Case Management will continue to follow and support the patient's home care needs.    Basilio Rice RN MS, BSN  [employer]   Zone Phone:[phone]  Desk Phone: [phone]  Pager: [phone]     oral

## 2024-01-29 ENCOUNTER — APPOINTMENT (OUTPATIENT)
Dept: UROLOGY | Facility: CLINIC | Age: 64
End: 2024-01-29
Payer: COMMERCIAL

## 2024-01-29 ENCOUNTER — OUTPATIENT (OUTPATIENT)
Dept: OUTPATIENT SERVICES | Facility: HOSPITAL | Age: 64
LOS: 1 days | End: 2024-01-29
Payer: COMMERCIAL

## 2024-01-29 VITALS
WEIGHT: 187 LBS | BODY MASS INDEX: 28.34 KG/M2 | TEMPERATURE: 98.2 F | RESPIRATION RATE: 17 BRPM | HEIGHT: 68 IN | SYSTOLIC BLOOD PRESSURE: 152 MMHG | DIASTOLIC BLOOD PRESSURE: 77 MMHG | HEART RATE: 72 BPM

## 2024-01-29 DIAGNOSIS — R35.0 FREQUENCY OF MICTURITION: ICD-10-CM

## 2024-01-29 DIAGNOSIS — R82.991 HYPOCITRATURIA: ICD-10-CM

## 2024-01-29 DIAGNOSIS — N20.0 CALCULUS OF KIDNEY: ICD-10-CM

## 2024-01-29 DIAGNOSIS — R82.6 ABNORMAL URINE LEVELS OF SUBSTANCES CHIEFLY NONMEDICINAL AS TO SOURCE: ICD-10-CM

## 2024-01-29 DIAGNOSIS — Z98.890 OTHER SPECIFIED POSTPROCEDURAL STATES: Chronic | ICD-10-CM

## 2024-01-29 DIAGNOSIS — R82.993 HYPERURICOSURIA: ICD-10-CM

## 2024-01-29 DIAGNOSIS — E87.0 HYPEROSMOLALITY AND HYPERNATREMIA: ICD-10-CM

## 2024-01-29 DIAGNOSIS — R82.994 HYPERCALCIURIA: ICD-10-CM

## 2024-01-29 PROCEDURE — 76775 US EXAM ABDO BACK WALL LIM: CPT

## 2024-01-29 PROCEDURE — 76775 US EXAM ABDO BACK WALL LIM: CPT | Mod: 26

## 2024-01-29 PROCEDURE — 99214 OFFICE O/P EST MOD 30 MIN: CPT

## 2024-02-03 PROBLEM — R82.993 HYPERURICOSURIA: Status: ACTIVE | Noted: 2021-07-02

## 2024-02-03 PROBLEM — R82.991 HYPOCITRATURIA: Status: ACTIVE | Noted: 2020-08-23

## 2024-02-03 PROBLEM — E87.0 HYPERNATRIURIA: Status: ACTIVE | Noted: 2021-07-02

## 2024-02-03 PROBLEM — R82.994 HYPERCALCIURIA: Status: ACTIVE | Noted: 2021-07-02

## 2024-02-03 PROBLEM — N20.0 NEPHROLITHIASIS: Status: ACTIVE | Noted: 2020-05-13

## 2024-02-03 PROBLEM — R82.6 ABNORMAL URINE LEVELS OF SUBSTANCES CHIEFLY NONMEDICINAL AS TO SOURCE: Status: ACTIVE | Noted: 2020-08-23

## 2024-02-03 NOTE — HISTORY OF PRESENT ILLNESS
[None] : None [FreeTextEntry1] : 63-year-old man Horseshoe kidney Hx of RIGHT PCNL on 5/29/20 and bilateral URS on 6/2/20 for kidney and ureteral stones in horseshoe kidney. Known residual unreachable small stones bilaterally stone analysis- 100% calcium oxalate monohydrate  I personally reviewed labs and images and discussed all pertinent findings with patient. Renal sono: Multiple small stones right side, left side difficult to visualize, but no obvious large stones and no hydronephrosis  24 hr urine: All parameters within normal limits, sodium and UUN/PCR further reduced

## 2024-02-03 NOTE — ASSESSMENT
[FreeTextEntry1] : Continue: Increase fluids intake Low-salt diet Reduce animal protein portions  24 hr urine ordered to be done before next visit Renal sono ordered to be done at or before next visit Follow up in 9 months  PSA screening done with PCP

## 2024-02-05 DIAGNOSIS — R82.991 HYPOCITRATURIA: ICD-10-CM

## 2024-02-05 DIAGNOSIS — R82.994 HYPERCALCIURIA: ICD-10-CM

## 2024-02-05 DIAGNOSIS — R82.6 ABNORMAL URINE LEVELS OF SUBSTANCES CHIEFLY NONMEDICINAL AS TO SOURCE: ICD-10-CM

## 2024-02-05 DIAGNOSIS — R82.993 HYPERURICOSURIA: ICD-10-CM

## 2024-02-05 DIAGNOSIS — N20.0 CALCULUS OF KIDNEY: ICD-10-CM

## 2024-02-05 DIAGNOSIS — E87.0 HYPEROSMOLALITY AND HYPERNATREMIA: ICD-10-CM

## 2025-03-17 NOTE — H&P PST ADULT - NSICDXPASTMEDICALHX_GEN_ALL_CORE_FT
I performed a history and physical exam of the patient and discussed their management with the resident. I reviewed the resident's note and agree with the documented findings and plan of care. My medical decision making and observations are found above.
PAST MEDICAL HISTORY:  HTN (hypertension)     Renal calculi

## 2025-03-31 ENCOUNTER — APPOINTMENT (OUTPATIENT)
Dept: UROLOGY | Facility: CLINIC | Age: 65
End: 2025-03-31
Payer: COMMERCIAL

## 2025-03-31 ENCOUNTER — OUTPATIENT (OUTPATIENT)
Dept: OUTPATIENT SERVICES | Facility: HOSPITAL | Age: 65
LOS: 1 days | End: 2025-03-31
Payer: COMMERCIAL

## 2025-03-31 VITALS
SYSTOLIC BLOOD PRESSURE: 146 MMHG | WEIGHT: 193 LBS | TEMPERATURE: 97.8 F | HEART RATE: 61 BPM | RESPIRATION RATE: 17 BRPM | HEIGHT: 68 IN | DIASTOLIC BLOOD PRESSURE: 81 MMHG | BODY MASS INDEX: 29.25 KG/M2

## 2025-03-31 DIAGNOSIS — Z87.448 PERSONAL HISTORY OF OTHER DISEASES OF URINARY SYSTEM: ICD-10-CM

## 2025-03-31 DIAGNOSIS — N20.1 CALCULUS OF URETER: ICD-10-CM

## 2025-03-31 DIAGNOSIS — R82.991 HYPOCITRATURIA: ICD-10-CM

## 2025-03-31 DIAGNOSIS — N20.0 CALCULUS OF KIDNEY: ICD-10-CM

## 2025-03-31 DIAGNOSIS — R82.993 HYPERURICOSURIA: ICD-10-CM

## 2025-03-31 DIAGNOSIS — R82.994 HYPERCALCIURIA: ICD-10-CM

## 2025-03-31 DIAGNOSIS — R82.6 ABNORMAL URINE LEVELS OF SUBSTANCES CHIEFLY NONMEDICINAL AS TO SOURCE: ICD-10-CM

## 2025-03-31 DIAGNOSIS — E87.0 HYPEROSMOLALITY AND HYPERNATREMIA: ICD-10-CM

## 2025-03-31 DIAGNOSIS — Q63.1 LOBULATED, FUSED AND HORSESHOE KIDNEY: ICD-10-CM

## 2025-03-31 DIAGNOSIS — Z98.890 OTHER SPECIFIED POSTPROCEDURAL STATES: Chronic | ICD-10-CM

## 2025-03-31 DIAGNOSIS — R35.0 FREQUENCY OF MICTURITION: ICD-10-CM

## 2025-03-31 PROCEDURE — G2211 COMPLEX E/M VISIT ADD ON: CPT | Mod: NC

## 2025-03-31 PROCEDURE — 99214 OFFICE O/P EST MOD 30 MIN: CPT

## 2025-03-31 PROCEDURE — 76775 US EXAM ABDO BACK WALL LIM: CPT | Mod: 26

## 2025-03-31 PROCEDURE — 76775 US EXAM ABDO BACK WALL LIM: CPT

## 2025-04-06 PROBLEM — N20.1 URETERAL STONE: Status: RESOLVED | Noted: 2020-05-13 | Resolved: 2025-04-06

## 2025-04-06 PROBLEM — Z87.448 HISTORY OF OBSTRUCTION OF URETER: Status: RESOLVED | Noted: 2020-05-13 | Resolved: 2025-04-06

## 2025-04-06 PROBLEM — N20.1 RIGHT URETERAL CALCULUS: Status: RESOLVED | Noted: 2020-05-13 | Resolved: 2025-04-06

## 2025-04-07 DIAGNOSIS — R82.993 HYPERURICOSURIA: ICD-10-CM

## 2025-04-07 DIAGNOSIS — R82.994 HYPERCALCIURIA: ICD-10-CM

## 2025-04-07 DIAGNOSIS — N20.0 CALCULUS OF KIDNEY: ICD-10-CM

## 2025-04-07 DIAGNOSIS — Q63.1 LOBULATED, FUSED AND HORSESHOE KIDNEY: ICD-10-CM

## 2025-04-07 DIAGNOSIS — E87.0 HYPEROSMOLALITY AND HYPERNATREMIA: ICD-10-CM

## 2025-04-07 DIAGNOSIS — R82.6 ABNORMAL URINE LEVELS OF SUBSTANCES CHIEFLY NONMEDICINAL AS TO SOURCE: ICD-10-CM

## 2025-04-07 DIAGNOSIS — R82.991 HYPOCITRATURIA: ICD-10-CM
